# Patient Record
Sex: FEMALE | Race: ASIAN | NOT HISPANIC OR LATINO
[De-identification: names, ages, dates, MRNs, and addresses within clinical notes are randomized per-mention and may not be internally consistent; named-entity substitution may affect disease eponyms.]

---

## 2018-05-15 ENCOUNTER — RESULT REVIEW (OUTPATIENT)
Age: 33
End: 2018-05-15

## 2018-05-17 PROBLEM — Z00.00 ENCOUNTER FOR PREVENTIVE HEALTH EXAMINATION: Status: ACTIVE | Noted: 2018-05-17

## 2018-05-22 ENCOUNTER — APPOINTMENT (OUTPATIENT)
Dept: ANTEPARTUM | Facility: CLINIC | Age: 33
End: 2018-05-22
Payer: COMMERCIAL

## 2018-05-22 ENCOUNTER — ASOB RESULT (OUTPATIENT)
Age: 33
End: 2018-05-22

## 2018-05-22 PROCEDURE — 76801 OB US < 14 WKS SINGLE FETUS: CPT

## 2018-06-14 ENCOUNTER — ASOB RESULT (OUTPATIENT)
Age: 33
End: 2018-06-14

## 2018-06-14 ENCOUNTER — APPOINTMENT (OUTPATIENT)
Dept: ANTEPARTUM | Facility: CLINIC | Age: 33
End: 2018-06-14
Payer: COMMERCIAL

## 2018-06-14 PROCEDURE — 36416 COLLJ CAPILLARY BLOOD SPEC: CPT

## 2018-06-14 PROCEDURE — 76813 OB US NUCHAL MEAS 1 GEST: CPT

## 2018-07-12 ENCOUNTER — ASOB RESULT (OUTPATIENT)
Age: 33
End: 2018-07-12

## 2018-07-12 ENCOUNTER — APPOINTMENT (OUTPATIENT)
Dept: ANTEPARTUM | Facility: CLINIC | Age: 33
End: 2018-07-12
Payer: COMMERCIAL

## 2018-07-12 PROCEDURE — 76805 OB US >/= 14 WKS SNGL FETUS: CPT

## 2018-08-06 ENCOUNTER — ASOB RESULT (OUTPATIENT)
Age: 33
End: 2018-08-06

## 2018-08-06 ENCOUNTER — APPOINTMENT (OUTPATIENT)
Dept: ANTEPARTUM | Facility: CLINIC | Age: 33
End: 2018-08-06
Payer: COMMERCIAL

## 2018-08-06 PROCEDURE — 76811 OB US DETAILED SNGL FETUS: CPT

## 2018-08-06 PROCEDURE — 76817 TRANSVAGINAL US OBSTETRIC: CPT

## 2018-09-06 ENCOUNTER — ASOB RESULT (OUTPATIENT)
Age: 33
End: 2018-09-06

## 2018-09-06 ENCOUNTER — APPOINTMENT (OUTPATIENT)
Dept: ANTEPARTUM | Facility: CLINIC | Age: 33
End: 2018-09-06

## 2018-09-06 ENCOUNTER — APPOINTMENT (OUTPATIENT)
Dept: ANTEPARTUM | Facility: CLINIC | Age: 33
End: 2018-09-06
Payer: COMMERCIAL

## 2018-09-06 PROCEDURE — 76816 OB US FOLLOW-UP PER FETUS: CPT

## 2018-10-04 ENCOUNTER — APPOINTMENT (OUTPATIENT)
Dept: ANTEPARTUM | Facility: CLINIC | Age: 33
End: 2018-10-04

## 2018-11-05 ENCOUNTER — ASOB RESULT (OUTPATIENT)
Age: 33
End: 2018-11-05

## 2018-11-05 ENCOUNTER — APPOINTMENT (OUTPATIENT)
Dept: ANTEPARTUM | Facility: CLINIC | Age: 33
End: 2018-11-05
Payer: COMMERCIAL

## 2018-11-05 PROCEDURE — 76816 OB US FOLLOW-UP PER FETUS: CPT

## 2018-12-05 ENCOUNTER — OUTPATIENT (OUTPATIENT)
Dept: OUTPATIENT SERVICES | Facility: HOSPITAL | Age: 33
LOS: 1 days | End: 2018-12-05
Payer: COMMERCIAL

## 2018-12-05 DIAGNOSIS — Z3A.00 WEEKS OF GESTATION OF PREGNANCY NOT SPECIFIED: ICD-10-CM

## 2018-12-05 DIAGNOSIS — O26.899 OTHER SPECIFIED PREGNANCY RELATED CONDITIONS, UNSPECIFIED TRIMESTER: ICD-10-CM

## 2018-12-05 LAB
ALBUMIN SERPL ELPH-MCNC: 3.3 G/DL — SIGNIFICANT CHANGE UP (ref 3.3–5)
ALP SERPL-CCNC: 160 U/L — HIGH (ref 40–120)
ALT FLD-CCNC: 17 U/L — SIGNIFICANT CHANGE UP (ref 10–45)
ANION GAP SERPL CALC-SCNC: 17 MMOL/L — SIGNIFICANT CHANGE UP (ref 5–17)
APPEARANCE UR: CLEAR — SIGNIFICANT CHANGE UP
AST SERPL-CCNC: 23 U/L — SIGNIFICANT CHANGE UP (ref 10–40)
BASOPHILS # BLD AUTO: 0 K/UL — SIGNIFICANT CHANGE UP (ref 0–0.2)
BASOPHILS NFR BLD AUTO: 0.2 % — SIGNIFICANT CHANGE UP (ref 0–2)
BILIRUB SERPL-MCNC: 0.2 MG/DL — SIGNIFICANT CHANGE UP (ref 0.2–1.2)
BILIRUB UR-MCNC: NEGATIVE — SIGNIFICANT CHANGE UP
BUN SERPL-MCNC: 8 MG/DL — SIGNIFICANT CHANGE UP (ref 7–23)
CALCIUM SERPL-MCNC: 9.5 MG/DL — SIGNIFICANT CHANGE UP (ref 8.4–10.5)
CHLORIDE SERPL-SCNC: 104 MMOL/L — SIGNIFICANT CHANGE UP (ref 96–108)
CO2 SERPL-SCNC: 18 MMOL/L — LOW (ref 22–31)
COLOR SPEC: SIGNIFICANT CHANGE UP
CREAT SERPL-MCNC: 0.46 MG/DL — LOW (ref 0.5–1.3)
DIFF PNL FLD: NEGATIVE — SIGNIFICANT CHANGE UP
EOSINOPHIL # BLD AUTO: 0.1 K/UL — SIGNIFICANT CHANGE UP (ref 0–0.5)
EOSINOPHIL NFR BLD AUTO: 1.2 % — SIGNIFICANT CHANGE UP (ref 0–6)
GLUCOSE SERPL-MCNC: 105 MG/DL — HIGH (ref 70–99)
GLUCOSE UR QL: NEGATIVE — SIGNIFICANT CHANGE UP
HCT VFR BLD CALC: 38.9 % — SIGNIFICANT CHANGE UP (ref 34.5–45)
HGB BLD-MCNC: 13.6 G/DL — SIGNIFICANT CHANGE UP (ref 11.5–15.5)
KETONES UR-MCNC: NEGATIVE — SIGNIFICANT CHANGE UP
LEUKOCYTE ESTERASE UR-ACNC: ABNORMAL
LYMPHOCYTES # BLD AUTO: 1.9 K/UL — SIGNIFICANT CHANGE UP (ref 1–3.3)
LYMPHOCYTES # BLD AUTO: 18.1 % — SIGNIFICANT CHANGE UP (ref 13–44)
MCHC RBC-ENTMCNC: 29.6 PG — SIGNIFICANT CHANGE UP (ref 27–34)
MCHC RBC-ENTMCNC: 34.9 GM/DL — SIGNIFICANT CHANGE UP (ref 32–36)
MCV RBC AUTO: 84.9 FL — SIGNIFICANT CHANGE UP (ref 80–100)
MONOCYTES # BLD AUTO: 0.5 K/UL — SIGNIFICANT CHANGE UP (ref 0–0.9)
MONOCYTES NFR BLD AUTO: 4.8 % — SIGNIFICANT CHANGE UP (ref 2–14)
NEUTROPHILS # BLD AUTO: 7.8 K/UL — HIGH (ref 1.8–7.4)
NEUTROPHILS NFR BLD AUTO: 75.7 % — SIGNIFICANT CHANGE UP (ref 43–77)
NITRITE UR-MCNC: NEGATIVE — SIGNIFICANT CHANGE UP
PH UR: 6.5 — SIGNIFICANT CHANGE UP (ref 5–8)
PLATELET # BLD AUTO: 254 K/UL — SIGNIFICANT CHANGE UP (ref 150–400)
POTASSIUM SERPL-MCNC: 3.9 MMOL/L — SIGNIFICANT CHANGE UP (ref 3.5–5.3)
POTASSIUM SERPL-SCNC: 3.9 MMOL/L — SIGNIFICANT CHANGE UP (ref 3.5–5.3)
PROT SERPL-MCNC: 6.7 G/DL — SIGNIFICANT CHANGE UP (ref 6–8.3)
PROT UR-MCNC: NEGATIVE — SIGNIFICANT CHANGE UP
RBC # BLD: 4.58 M/UL — SIGNIFICANT CHANGE UP (ref 3.8–5.2)
RBC # FLD: 13.6 % — SIGNIFICANT CHANGE UP (ref 10.3–14.5)
RH IG SCN BLD-IMP: POSITIVE — SIGNIFICANT CHANGE UP
SODIUM SERPL-SCNC: 139 MMOL/L — SIGNIFICANT CHANGE UP (ref 135–145)
SP GR SPEC: 1.01 — SIGNIFICANT CHANGE UP (ref 1.01–1.02)
UROBILINOGEN FLD QL: NEGATIVE — SIGNIFICANT CHANGE UP
WBC # BLD: 10.3 K/UL — SIGNIFICANT CHANGE UP (ref 3.8–10.5)
WBC # FLD AUTO: 10.3 K/UL — SIGNIFICANT CHANGE UP (ref 3.8–10.5)

## 2018-12-05 PROCEDURE — 86901 BLOOD TYPING SEROLOGIC RH(D): CPT

## 2018-12-05 PROCEDURE — 80053 COMPREHEN METABOLIC PANEL: CPT

## 2018-12-05 PROCEDURE — 86850 RBC ANTIBODY SCREEN: CPT

## 2018-12-05 PROCEDURE — 81001 URINALYSIS AUTO W/SCOPE: CPT

## 2018-12-05 PROCEDURE — 85027 COMPLETE CBC AUTOMATED: CPT

## 2018-12-05 PROCEDURE — 86900 BLOOD TYPING SEROLOGIC ABO: CPT

## 2018-12-05 PROCEDURE — 59025 FETAL NON-STRESS TEST: CPT | Mod: 26

## 2018-12-05 PROCEDURE — 59025 FETAL NON-STRESS TEST: CPT

## 2018-12-05 PROCEDURE — G0463: CPT

## 2018-12-05 RX ORDER — SODIUM CHLORIDE 9 MG/ML
1000 INJECTION, SOLUTION INTRAVENOUS
Qty: 0 | Refills: 0 | Status: DISCONTINUED | OUTPATIENT
Start: 2018-12-05 | End: 2018-12-20

## 2018-12-05 RX ORDER — SODIUM CHLORIDE 9 MG/ML
500 INJECTION, SOLUTION INTRAVENOUS ONCE
Qty: 0 | Refills: 0 | Status: COMPLETED | OUTPATIENT
Start: 2018-12-05 | End: 2018-12-05

## 2018-12-05 RX ORDER — ACETAMINOPHEN 500 MG
975 TABLET ORAL ONCE
Qty: 0 | Refills: 0 | Status: COMPLETED | OUTPATIENT
Start: 2018-12-05 | End: 2018-12-05

## 2018-12-10 ENCOUNTER — ASOB RESULT (OUTPATIENT)
Age: 33
End: 2018-12-10

## 2018-12-10 ENCOUNTER — APPOINTMENT (OUTPATIENT)
Dept: ANTEPARTUM | Facility: CLINIC | Age: 33
End: 2018-12-10
Payer: COMMERCIAL

## 2018-12-10 PROCEDURE — 76816 OB US FOLLOW-UP PER FETUS: CPT

## 2018-12-20 ENCOUNTER — APPOINTMENT (OUTPATIENT)
Dept: ANTEPARTUM | Facility: CLINIC | Age: 33
End: 2018-12-20

## 2018-12-20 ENCOUNTER — INPATIENT (INPATIENT)
Facility: HOSPITAL | Age: 33
LOS: 1 days | Discharge: HOME | DRG: 776 | End: 2018-12-22
Attending: OBSTETRICS & GYNECOLOGY | Admitting: OBSTETRICS & GYNECOLOGY
Payer: COMMERCIAL

## 2018-12-20 VITALS — WEIGHT: 180.78 LBS | HEIGHT: 62 IN

## 2018-12-20 DIAGNOSIS — O26.899 OTHER SPECIFIED PREGNANCY RELATED CONDITIONS, UNSPECIFIED TRIMESTER: ICD-10-CM

## 2018-12-20 DIAGNOSIS — Z34.80 ENCOUNTER FOR SUPERVISION OF OTHER NORMAL PREGNANCY, UNSPECIFIED TRIMESTER: ICD-10-CM

## 2018-12-20 DIAGNOSIS — Z3A.00 WEEKS OF GESTATION OF PREGNANCY NOT SPECIFIED: ICD-10-CM

## 2018-12-20 LAB
ALBUMIN SERPL ELPH-MCNC: 2.8 G/DL — LOW (ref 3.3–5)
ALP SERPL-CCNC: 138 U/L — HIGH (ref 40–120)
ALT FLD-CCNC: 19 U/L — SIGNIFICANT CHANGE UP (ref 10–45)
ANION GAP SERPL CALC-SCNC: 13 MMOL/L — SIGNIFICANT CHANGE UP (ref 5–17)
APPEARANCE UR: ABNORMAL
APTT BLD: 27.1 SEC — LOW (ref 27.5–36.3)
AST SERPL-CCNC: 30 U/L — SIGNIFICANT CHANGE UP (ref 10–40)
BASOPHILS # BLD AUTO: 0 K/UL — SIGNIFICANT CHANGE UP (ref 0–0.2)
BASOPHILS # BLD AUTO: 0.1 K/UL — SIGNIFICANT CHANGE UP (ref 0–0.2)
BASOPHILS NFR BLD AUTO: 0.3 % — SIGNIFICANT CHANGE UP (ref 0–2)
BASOPHILS NFR BLD AUTO: 0.4 % — SIGNIFICANT CHANGE UP (ref 0–2)
BILIRUB SERPL-MCNC: 0.4 MG/DL — SIGNIFICANT CHANGE UP (ref 0.2–1.2)
BILIRUB UR-MCNC: NEGATIVE — SIGNIFICANT CHANGE UP
BLD GP AB SCN SERPL QL: NEGATIVE — SIGNIFICANT CHANGE UP
BUN SERPL-MCNC: 7 MG/DL — SIGNIFICANT CHANGE UP (ref 7–23)
CALCIUM SERPL-MCNC: 8.9 MG/DL — SIGNIFICANT CHANGE UP (ref 8.4–10.5)
CHLORIDE SERPL-SCNC: 102 MMOL/L — SIGNIFICANT CHANGE UP (ref 96–108)
CO2 SERPL-SCNC: 23 MMOL/L — SIGNIFICANT CHANGE UP (ref 22–31)
COLOR SPEC: COLORLESS — SIGNIFICANT CHANGE UP
CREAT SERPL-MCNC: 0.54 MG/DL — SIGNIFICANT CHANGE UP (ref 0.5–1.3)
DIFF PNL FLD: ABNORMAL
EOSINOPHIL # BLD AUTO: 0 K/UL — SIGNIFICANT CHANGE UP (ref 0–0.5)
EOSINOPHIL # BLD AUTO: 0.1 K/UL — SIGNIFICANT CHANGE UP (ref 0–0.5)
EOSINOPHIL NFR BLD AUTO: 0.2 % — SIGNIFICANT CHANGE UP (ref 0–6)
EOSINOPHIL NFR BLD AUTO: 0.6 % — SIGNIFICANT CHANGE UP (ref 0–6)
FIBRINOGEN PPP-MCNC: 594 MG/DL — HIGH (ref 350–510)
GLUCOSE SERPL-MCNC: 110 MG/DL — HIGH (ref 70–99)
GLUCOSE UR QL: NEGATIVE — SIGNIFICANT CHANGE UP
HCT VFR BLD CALC: 35.2 % — SIGNIFICANT CHANGE UP (ref 34.5–45)
HCT VFR BLD CALC: 42.3 % — SIGNIFICANT CHANGE UP (ref 34.5–45)
HGB BLD-MCNC: 12.2 G/DL — SIGNIFICANT CHANGE UP (ref 11.5–15.5)
HGB BLD-MCNC: 13.4 G/DL — SIGNIFICANT CHANGE UP (ref 11.5–15.5)
INR BLD: 0.96 RATIO — SIGNIFICANT CHANGE UP (ref 0.88–1.16)
KETONES UR-MCNC: ABNORMAL
LDH SERPL L TO P-CCNC: 232 U/L — SIGNIFICANT CHANGE UP (ref 50–242)
LEUKOCYTE ESTERASE UR-ACNC: NEGATIVE — SIGNIFICANT CHANGE UP
LYMPHOCYTES # BLD AUTO: 1.2 K/UL — SIGNIFICANT CHANGE UP (ref 1–3.3)
LYMPHOCYTES # BLD AUTO: 1.6 K/UL — SIGNIFICANT CHANGE UP (ref 1–3.3)
LYMPHOCYTES # BLD AUTO: 14.2 % — SIGNIFICANT CHANGE UP (ref 13–44)
LYMPHOCYTES # BLD AUTO: 7.4 % — LOW (ref 13–44)
MCHC RBC-ENTMCNC: 26.6 PG — LOW (ref 27–34)
MCHC RBC-ENTMCNC: 29.4 PG — SIGNIFICANT CHANGE UP (ref 27–34)
MCHC RBC-ENTMCNC: 31.6 GM/DL — LOW (ref 32–36)
MCHC RBC-ENTMCNC: 34.8 GM/DL — SIGNIFICANT CHANGE UP (ref 32–36)
MCV RBC AUTO: 84.2 FL — SIGNIFICANT CHANGE UP (ref 80–100)
MCV RBC AUTO: 84.5 FL — SIGNIFICANT CHANGE UP (ref 80–100)
MONOCYTES # BLD AUTO: 0.5 K/UL — SIGNIFICANT CHANGE UP (ref 0–0.9)
MONOCYTES # BLD AUTO: 0.7 K/UL — SIGNIFICANT CHANGE UP (ref 0–0.9)
MONOCYTES NFR BLD AUTO: 4.1 % — SIGNIFICANT CHANGE UP (ref 2–14)
MONOCYTES NFR BLD AUTO: 4.2 % — SIGNIFICANT CHANGE UP (ref 2–14)
NEUTROPHILS # BLD AUTO: 14.3 K/UL — HIGH (ref 1.8–7.4)
NEUTROPHILS # BLD AUTO: 8.8 K/UL — HIGH (ref 1.8–7.4)
NEUTROPHILS NFR BLD AUTO: 80.6 % — HIGH (ref 43–77)
NEUTROPHILS NFR BLD AUTO: 88 % — HIGH (ref 43–77)
NITRITE UR-MCNC: NEGATIVE — SIGNIFICANT CHANGE UP
PH UR: 7 — SIGNIFICANT CHANGE UP (ref 5–8)
PLATELET # BLD AUTO: 201 K/UL — SIGNIFICANT CHANGE UP (ref 150–400)
PLATELET # BLD AUTO: 256 K/UL — SIGNIFICANT CHANGE UP (ref 150–400)
POTASSIUM SERPL-MCNC: 3.3 MMOL/L — LOW (ref 3.5–5.3)
POTASSIUM SERPL-SCNC: 3.3 MMOL/L — LOW (ref 3.5–5.3)
PROT SERPL-MCNC: 5.6 G/DL — LOW (ref 6–8.3)
PROT UR-MCNC: NEGATIVE — SIGNIFICANT CHANGE UP
PROTHROM AB SERPL-ACNC: 11 SEC — SIGNIFICANT CHANGE UP (ref 10–12.9)
RBC # BLD: 4.16 M/UL — SIGNIFICANT CHANGE UP (ref 3.8–5.2)
RBC # BLD: 5.02 M/UL — SIGNIFICANT CHANGE UP (ref 3.8–5.2)
RBC # FLD: 13 % — SIGNIFICANT CHANGE UP (ref 10.3–14.5)
RBC # FLD: 13.4 % — SIGNIFICANT CHANGE UP (ref 10.3–14.5)
RH IG SCN BLD-IMP: POSITIVE — SIGNIFICANT CHANGE UP
SODIUM SERPL-SCNC: 138 MMOL/L — SIGNIFICANT CHANGE UP (ref 135–145)
SP GR SPEC: 1.01 — SIGNIFICANT CHANGE UP (ref 1.01–1.02)
T PALLIDUM AB TITR SER: NEGATIVE — SIGNIFICANT CHANGE UP
URATE SERPL-MCNC: 4.3 MG/DL — SIGNIFICANT CHANGE UP (ref 2.5–7)
UROBILINOGEN FLD QL: NEGATIVE — SIGNIFICANT CHANGE UP
WBC # BLD: 11 K/UL — HIGH (ref 3.8–10.5)
WBC # BLD: 16.3 K/UL — HIGH (ref 3.8–10.5)
WBC # FLD AUTO: 11 K/UL — HIGH (ref 3.8–10.5)
WBC # FLD AUTO: 16.3 K/UL — HIGH (ref 3.8–10.5)

## 2018-12-20 RX ORDER — KETOROLAC TROMETHAMINE 30 MG/ML
30 SYRINGE (ML) INJECTION ONCE
Qty: 0 | Refills: 0 | Status: DISCONTINUED | OUTPATIENT
Start: 2018-12-20 | End: 2018-12-20

## 2018-12-20 RX ORDER — OXYTOCIN 10 UNIT/ML
333.33 VIAL (ML) INJECTION
Qty: 20 | Refills: 0 | Status: COMPLETED | OUTPATIENT
Start: 2018-12-20

## 2018-12-20 RX ORDER — LANOLIN
1 OINTMENT (GRAM) TOPICAL EVERY 6 HOURS
Qty: 0 | Refills: 0 | Status: DISCONTINUED | OUTPATIENT
Start: 2018-12-20 | End: 2018-12-22

## 2018-12-20 RX ORDER — IBUPROFEN 200 MG
600 TABLET ORAL EVERY 6 HOURS
Qty: 0 | Refills: 0 | Status: DISCONTINUED | OUTPATIENT
Start: 2018-12-20 | End: 2018-12-22

## 2018-12-20 RX ORDER — OXYTOCIN 10 UNIT/ML
333.33 VIAL (ML) INJECTION
Qty: 20 | Refills: 0 | Status: DISCONTINUED | OUTPATIENT
Start: 2018-12-20 | End: 2018-12-22

## 2018-12-20 RX ORDER — OXYTOCIN 10 UNIT/ML
41.67 VIAL (ML) INJECTION
Qty: 20 | Refills: 0 | Status: DISCONTINUED | OUTPATIENT
Start: 2018-12-20 | End: 2018-12-22

## 2018-12-20 RX ORDER — OXYCODONE HYDROCHLORIDE 5 MG/1
5 TABLET ORAL EVERY 4 HOURS
Qty: 0 | Refills: 0 | Status: DISCONTINUED | OUTPATIENT
Start: 2018-12-20 | End: 2018-12-22

## 2018-12-20 RX ORDER — DIPHENHYDRAMINE HCL 50 MG
25 CAPSULE ORAL EVERY 6 HOURS
Qty: 0 | Refills: 0 | Status: DISCONTINUED | OUTPATIENT
Start: 2018-12-20 | End: 2018-12-22

## 2018-12-20 RX ORDER — AER TRAVELER 0.5 G/1
1 SOLUTION RECTAL; TOPICAL EVERY 4 HOURS
Qty: 0 | Refills: 0 | Status: DISCONTINUED | OUTPATIENT
Start: 2018-12-20 | End: 2018-12-22

## 2018-12-20 RX ORDER — IBUPROFEN 200 MG
600 TABLET ORAL EVERY 6 HOURS
Qty: 0 | Refills: 0 | Status: COMPLETED | OUTPATIENT
Start: 2018-12-20 | End: 2019-11-18

## 2018-12-20 RX ORDER — TETANUS TOXOID, REDUCED DIPHTHERIA TOXOID AND ACELLULAR PERTUSSIS VACCINE, ADSORBED 5; 2.5; 8; 8; 2.5 [IU]/.5ML; [IU]/.5ML; UG/.5ML; UG/.5ML; UG/.5ML
0.5 SUSPENSION INTRAMUSCULAR ONCE
Qty: 0 | Refills: 0 | Status: DISCONTINUED | OUTPATIENT
Start: 2018-12-20 | End: 2018-12-22

## 2018-12-20 RX ORDER — SODIUM CHLORIDE 9 MG/ML
1000 INJECTION, SOLUTION INTRAVENOUS
Qty: 0 | Refills: 0 | Status: DISCONTINUED | OUTPATIENT
Start: 2018-12-20 | End: 2018-12-20

## 2018-12-20 RX ORDER — SIMETHICONE 80 MG/1
80 TABLET, CHEWABLE ORAL EVERY 6 HOURS
Qty: 0 | Refills: 0 | Status: DISCONTINUED | OUTPATIENT
Start: 2018-12-20 | End: 2018-12-22

## 2018-12-20 RX ORDER — HYDROCORTISONE 1 %
1 OINTMENT (GRAM) TOPICAL EVERY 4 HOURS
Qty: 0 | Refills: 0 | Status: DISCONTINUED | OUTPATIENT
Start: 2018-12-20 | End: 2018-12-22

## 2018-12-20 RX ORDER — SODIUM CHLORIDE 9 MG/ML
3 INJECTION INTRAMUSCULAR; INTRAVENOUS; SUBCUTANEOUS EVERY 8 HOURS
Qty: 0 | Refills: 0 | Status: DISCONTINUED | OUTPATIENT
Start: 2018-12-20 | End: 2018-12-22

## 2018-12-20 RX ORDER — MAGNESIUM HYDROXIDE 400 MG/1
30 TABLET, CHEWABLE ORAL
Qty: 0 | Refills: 0 | Status: DISCONTINUED | OUTPATIENT
Start: 2018-12-20 | End: 2018-12-22

## 2018-12-20 RX ORDER — GLYCERIN ADULT
1 SUPPOSITORY, RECTAL RECTAL AT BEDTIME
Qty: 0 | Refills: 0 | Status: DISCONTINUED | OUTPATIENT
Start: 2018-12-20 | End: 2018-12-22

## 2018-12-20 RX ORDER — PRAMOXINE HYDROCHLORIDE 150 MG/15G
1 AEROSOL, FOAM RECTAL EVERY 4 HOURS
Qty: 0 | Refills: 0 | Status: DISCONTINUED | OUTPATIENT
Start: 2018-12-20 | End: 2018-12-22

## 2018-12-20 RX ORDER — DIBUCAINE 1 %
1 OINTMENT (GRAM) RECTAL EVERY 4 HOURS
Qty: 0 | Refills: 0 | Status: DISCONTINUED | OUTPATIENT
Start: 2018-12-20 | End: 2018-12-22

## 2018-12-20 RX ORDER — ACETAMINOPHEN 500 MG
975 TABLET ORAL EVERY 6 HOURS
Qty: 0 | Refills: 0 | Status: DISCONTINUED | OUTPATIENT
Start: 2018-12-20 | End: 2018-12-22

## 2018-12-20 RX ORDER — SODIUM CHLORIDE 9 MG/ML
500 INJECTION, SOLUTION INTRAVENOUS ONCE
Qty: 0 | Refills: 0 | Status: COMPLETED | OUTPATIENT
Start: 2018-12-20 | End: 2018-12-20

## 2018-12-20 RX ORDER — ACETAMINOPHEN 500 MG
975 TABLET ORAL EVERY 6 HOURS
Qty: 0 | Refills: 0 | Status: COMPLETED | OUTPATIENT
Start: 2018-12-20 | End: 2019-11-18

## 2018-12-20 RX ORDER — DOCUSATE SODIUM 100 MG
100 CAPSULE ORAL
Qty: 0 | Refills: 0 | Status: DISCONTINUED | OUTPATIENT
Start: 2018-12-20 | End: 2018-12-22

## 2018-12-20 RX ORDER — OXYCODONE HYDROCHLORIDE 5 MG/1
5 TABLET ORAL
Qty: 0 | Refills: 0 | Status: DISCONTINUED | OUTPATIENT
Start: 2018-12-20 | End: 2018-12-22

## 2018-12-20 RX ORDER — OXYTOCIN 10 UNIT/ML
333.33 VIAL (ML) INJECTION
Qty: 20 | Refills: 0 | Status: DISCONTINUED | OUTPATIENT
Start: 2018-12-20 | End: 2018-12-20

## 2018-12-20 RX ORDER — CITRIC ACID/SODIUM CITRATE 300-500 MG
15 SOLUTION, ORAL ORAL EVERY 4 HOURS
Qty: 0 | Refills: 0 | Status: DISCONTINUED | OUTPATIENT
Start: 2018-12-20 | End: 2018-12-20

## 2018-12-20 RX ADMIN — SODIUM CHLORIDE 125 MILLILITER(S): 9 INJECTION, SOLUTION INTRAVENOUS at 11:39

## 2018-12-20 RX ADMIN — SODIUM CHLORIDE 125 MILLILITER(S): 9 INJECTION, SOLUTION INTRAVENOUS at 10:31

## 2018-12-20 RX ADMIN — Medication 1000 MILLIUNIT(S)/MIN: at 14:32

## 2018-12-20 RX ADMIN — SODIUM CHLORIDE 125 MILLILITER(S): 9 INJECTION, SOLUTION INTRAVENOUS at 09:15

## 2018-12-20 RX ADMIN — Medication 30 MILLIGRAM(S): at 16:12

## 2018-12-20 RX ADMIN — SODIUM CHLORIDE 1000 MILLILITER(S): 9 INJECTION, SOLUTION INTRAVENOUS at 08:45

## 2018-12-20 NOTE — CHART NOTE - NSCHARTNOTEFT_GEN_A_CORE
PA Note  called to evaluate patient by RN for extra bleeding.  pt resting in bed with some pain- WNL  RN reports pt was unable to urinate straight cath yielded 1000cc- per RN  vsICU Vital Signs Last 24 Hrs  T(C): 36.6 (20 Dec 2018 13:45), Max: 36.6 (20 Dec 2018 13:45)  T(F): 97.9 (20 Dec 2018 13:45), Max: 97.9 (20 Dec 2018 13:45)  HR: 102 (20 Dec 2018 15:45) (78 - 108)  BP: 147/88 (20 Dec 2018 15:45) (117/66 - 147/88)  BP(mean): 112 (20 Dec 2018 15:45) (93 - 112)  ABP: --  ABP(mean): --  RR: 25 (20 Dec 2018 15:45) (18 - 30)  SpO2: 95% (20 Dec 2018 15:45) (94% - 96%)      ve- about 50cc total clot excavated. no active bleeding noted at this time  34 yo s/p  with elevated BP and unable to void   HELLP labs  cameron catheter to prevent urinary retention  monitor sx sx of bleeding  cont routine postpartum care  DW Dr Julio Dacosta PA-C

## 2018-12-20 NOTE — PROVIDER CONTACT NOTE (OTHER) - BACKGROUND
pt awaiting d/c from recovery room and was unable to void on her on due to swelling from . 1000mL clear yellow urine emptied and then pt was havbing some moderate bleeding.

## 2018-12-20 NOTE — PROVIDER CONTACT NOTE (OTHER) - ACTION/TREATMENT ORDERED:
bill came and removed about 50 mL of clots from uterus. bleeding controlled after that. will monitor.

## 2018-12-21 ENCOUNTER — TRANSCRIPTION ENCOUNTER (OUTPATIENT)
Age: 33
End: 2018-12-21

## 2018-12-21 LAB
HCT VFR BLD CALC: 31.1 % — LOW (ref 34.5–45)
HGB BLD-MCNC: 10.3 G/DL — LOW (ref 11.5–15.5)

## 2018-12-21 RX ORDER — DOCUSATE SODIUM 100 MG
1 CAPSULE ORAL
Qty: 0 | Refills: 0 | DISCHARGE
Start: 2018-12-21

## 2018-12-21 RX ORDER — IBUPROFEN 200 MG
1 TABLET ORAL
Qty: 0 | Refills: 0 | DISCHARGE
Start: 2018-12-21

## 2018-12-21 RX ORDER — ACETAMINOPHEN 500 MG
3 TABLET ORAL
Qty: 0 | Refills: 0 | DISCHARGE
Start: 2018-12-21

## 2018-12-21 RX ORDER — SIMETHICONE 80 MG/1
1 TABLET, CHEWABLE ORAL
Qty: 0 | Refills: 0 | DISCHARGE
Start: 2018-12-21

## 2018-12-21 RX ADMIN — Medication 100 MILLIGRAM(S): at 09:05

## 2018-12-21 RX ADMIN — Medication 600 MILLIGRAM(S): at 12:38

## 2018-12-21 RX ADMIN — Medication 975 MILLIGRAM(S): at 14:15

## 2018-12-21 RX ADMIN — Medication 600 MILLIGRAM(S): at 05:41

## 2018-12-21 RX ADMIN — Medication 1 TABLET(S): at 12:38

## 2018-12-21 RX ADMIN — OXYCODONE HYDROCHLORIDE 5 MILLIGRAM(S): 5 TABLET ORAL at 09:05

## 2018-12-21 RX ADMIN — Medication 975 MILLIGRAM(S): at 15:06

## 2018-12-21 RX ADMIN — Medication 600 MILLIGRAM(S): at 13:30

## 2018-12-21 RX ADMIN — Medication 975 MILLIGRAM(S): at 05:42

## 2018-12-21 RX ADMIN — Medication 600 MILLIGRAM(S): at 06:27

## 2018-12-21 RX ADMIN — OXYCODONE HYDROCHLORIDE 5 MILLIGRAM(S): 5 TABLET ORAL at 13:00

## 2018-12-21 RX ADMIN — OXYCODONE HYDROCHLORIDE 5 MILLIGRAM(S): 5 TABLET ORAL at 13:30

## 2018-12-21 RX ADMIN — Medication 600 MILLIGRAM(S): at 18:16

## 2018-12-21 RX ADMIN — OXYCODONE HYDROCHLORIDE 5 MILLIGRAM(S): 5 TABLET ORAL at 10:00

## 2018-12-21 RX ADMIN — Medication 975 MILLIGRAM(S): at 06:27

## 2018-12-21 RX ADMIN — Medication 975 MILLIGRAM(S): at 20:55

## 2018-12-21 RX ADMIN — Medication 975 MILLIGRAM(S): at 20:17

## 2018-12-21 NOTE — DISCHARGE NOTE OB - CARE PROVIDER_API CALL
Dylon Wilburn), Obstetrics and Gynecology  [address] [facility]  [address]  [place] [address]  Phone: [phone]  Fax: [phone]

## 2018-12-21 NOTE — DISCHARGE NOTE OB - MATERIALS PROVIDED
Immunization Record/Breastfeeding Log/Back To Sleep Handout/Breastfeeding Mother’s Support Group Information/Guide to Postpartum Care/Birth Certificate Instructions/[facility] Hearing Screen Program/Shaken Baby Prevention Handout/Breastfeeding Guide and Packet/Vaccinations/[facility] [place] Screening Program

## 2018-12-21 NOTE — DISCHARGE NOTE OB - CARE PLAN
Principal Discharge DX:	Normal vaginal delivery  Goal:	recovery  Assessment and plan of treatment:	see below

## 2018-12-21 NOTE — DISCHARGE NOTE OB - PATIENT PORTAL LINK FT
You can access the [facility] Patient Portal, offered by [facility], by registering with the following website: [email or web address]

## 2018-12-21 NOTE — PROGRESS NOTE ADULT - ATTENDING COMMENTS
Pt seen and examined.  agree with note above.  pt doing well, voiding without issues  routine PP care.

## 2018-12-21 NOTE — PROGRESS NOTE ADULT - SUBJECTIVE AND OBJECTIVE BOX
Postpartum Note- PPD#1    Prenatal Labs  Blood type: O Positive  Rubella IgG: Imm  RPR: Negative        S:Patient w/o complaints, pain is controlled.    Pt is OOB, tolerating PO, voiding. Lochia WNL.     O:  Vital Signs Last 24 Hrs  T(C): 36.7 (21 Dec 2018 05:07), Max: 37.2 (20 Dec 2018 17:30)  T(F): 98 (21 Dec 2018 05:07), Max: 99 (20 Dec 2018 17:30)  HR: 89 (21 Dec 2018 05:07) (78 - 108)  BP: 125/86 (21 Dec 2018 05:07) (117/66 - 147/88)  BP(mean): 104 (20 Dec 2018 17:15) (93 - 112)  RR: 18 (21 Dec 2018 05:07) (16 - 32)  SpO2: 97% (21 Dec 2018 05:07) (94% - 98%)     Gen: NAD  Abdomen: Soft, nontender, non-distended, fundus firm.  Vaginal: Lochia WNL,    Ext:  Neg calf tenderness    LABS:    Hemoglobin: 10.3 g/dL (12-21 @ 08:35)  Hemoglobin: 12.2 g/dL (12-20 @ 17:15)  Hemoglobin: 13.4 g/dL (12-20 @ 09:34)      Hematocrit: 31.1 % (12-21 @ 08:35)  Hematocrit: 35.2 % (12-20 @ 17:15)  Hematocrit: 42.3 % (12-20 @ 09:34)

## 2018-12-21 NOTE — PROGRESS NOTE ADULT - ASSESSMENT
A/P:  33y  PPD # 1   S/P     with 2nd degree  laceration     Doing Well    PMHx: h/o fibroids  Current Issues: Urinary retention now resolved

## 2018-12-21 NOTE — DISCHARGE NOTE OB - MEDICATION SUMMARY - MEDICATIONS TO TAKE
I will START or STAY ON the medications listed below when I get home from the hospital:    acetaminophen 325 mg oral tablet  -- 3 tab(s) by mouth every 6 hours  -- Indication: For pain and fever    ibuprofen 600 mg oral tablet  -- 1 tab(s) by mouth every 6 hours  -- Indication: For for pain and cramping    PreNata  -- Indication: For for nutrition    docusate sodium 100 mg oral capsule  -- 1 cap(s) by mouth 2 times a day, As needed, Stool Softening  -- Indication: For for constipation    simethicone 80 mg oral tablet, chewable  -- 1 tab(s) by mouth every 6 hours, As needed, Gas  -- Indication: For for gas pain

## 2018-12-22 VITALS
TEMPERATURE: 98 F | SYSTOLIC BLOOD PRESSURE: 124 MMHG | HEART RATE: 101 BPM | DIASTOLIC BLOOD PRESSURE: 85 MMHG | RESPIRATION RATE: 18 BRPM

## 2018-12-22 PROCEDURE — 83615 LACTATE (LD) (LDH) ENZYME: CPT

## 2018-12-22 PROCEDURE — G0463: CPT

## 2018-12-22 PROCEDURE — 59025 FETAL NON-STRESS TEST: CPT

## 2018-12-22 PROCEDURE — 85018 HEMOGLOBIN: CPT

## 2018-12-22 PROCEDURE — 85384 FIBRINOGEN ACTIVITY: CPT

## 2018-12-22 PROCEDURE — 86780 TREPONEMA PALLIDUM: CPT

## 2018-12-22 PROCEDURE — 85730 THROMBOPLASTIN TIME PARTIAL: CPT

## 2018-12-22 PROCEDURE — 85610 PROTHROMBIN TIME: CPT

## 2018-12-22 PROCEDURE — 85027 COMPLETE CBC AUTOMATED: CPT

## 2018-12-22 PROCEDURE — 59050 FETAL MONITOR W/REPORT: CPT

## 2018-12-22 PROCEDURE — 84550 ASSAY OF BLOOD/URIC ACID: CPT

## 2018-12-22 PROCEDURE — 85014 HEMATOCRIT: CPT

## 2018-12-22 PROCEDURE — 80053 COMPREHEN METABOLIC PANEL: CPT

## 2018-12-22 PROCEDURE — 81001 URINALYSIS AUTO W/SCOPE: CPT

## 2018-12-22 RX ADMIN — Medication 600 MILLIGRAM(S): at 06:20

## 2018-12-22 RX ADMIN — Medication 600 MILLIGRAM(S): at 06:55

## 2018-12-22 RX ADMIN — Medication 975 MILLIGRAM(S): at 11:43

## 2018-12-22 RX ADMIN — Medication 600 MILLIGRAM(S): at 11:43

## 2018-12-22 RX ADMIN — OXYCODONE HYDROCHLORIDE 5 MILLIGRAM(S): 5 TABLET ORAL at 03:50

## 2018-12-22 RX ADMIN — OXYCODONE HYDROCHLORIDE 5 MILLIGRAM(S): 5 TABLET ORAL at 04:30

## 2018-12-22 RX ADMIN — Medication 975 MILLIGRAM(S): at 03:50

## 2018-12-22 RX ADMIN — Medication 975 MILLIGRAM(S): at 04:30

## 2018-12-22 NOTE — PROGRESS NOTE ADULT - SUBJECTIVE AND OBJECTIVE BOX
Ob Attg daily progress note:    s/p vaginal delivery 2 days ago.  She's now well, and offers no c/o.  no excessive bleeding.  cramps are mild.    Vital Signs Last 24 Hrs  T(C): 36.7 (22 Dec 2018 05:00), Max: 36.7 (21 Dec 2018 18:02)  T(F): 98.1 (22 Dec 2018 05:00), Max: 98.1 (21 Dec 2018 18:02)  HR: 101 (22 Dec 2018 05:00) (82 - 101)  BP: 124/85 (22 Dec 2018 05:00) (119/82 - 124/85)  BP(mean): --  RR: 18 (22 Dec 2018 05:00) (18 - 18)  SpO2: --    Abd is soft, NT, ND.  uterine fundus is firm and NT.  Ext: normal. trace edema.  no tenderness  Perineum:  intact.  lochia is WNL.  Back: normal.  Lungs / Heart: clear and normal.    A/P:    s/p vag delivery.  stable and well today.   Pt is cleared for discharge today.  Follow up and discharge instructions given and all q's answered.

## 2018-12-23 ENCOUNTER — INPATIENT (INPATIENT)
Facility: HOSPITAL | Age: 33
LOS: 0 days | Discharge: HOME | DRG: 776 | End: 2018-12-24
Attending: OBSTETRICS & GYNECOLOGY | Admitting: OBSTETRICS & GYNECOLOGY
Payer: COMMERCIAL

## 2018-12-23 VITALS
HEART RATE: 89 BPM | RESPIRATION RATE: 18 BRPM | WEIGHT: 181 LBS | SYSTOLIC BLOOD PRESSURE: 167 MMHG | HEIGHT: 62 IN | TEMPERATURE: 98 F | OXYGEN SATURATION: 96 % | DIASTOLIC BLOOD PRESSURE: 112 MMHG

## 2018-12-23 DIAGNOSIS — O14.90 UNSPECIFIED PRE-ECLAMPSIA, UNSPECIFIED TRIMESTER: ICD-10-CM

## 2018-12-23 DIAGNOSIS — O14.10 SEVERE PRE-ECLAMPSIA, UNSPECIFIED TRIMESTER: ICD-10-CM

## 2018-12-23 LAB
ALBUMIN SERPL ELPH-MCNC: 3.3 G/DL — SIGNIFICANT CHANGE UP (ref 3.3–5)
ALP SERPL-CCNC: 123 U/L — HIGH (ref 40–120)
ALT FLD-CCNC: 27 U/L — SIGNIFICANT CHANGE UP (ref 10–45)
ANION GAP SERPL CALC-SCNC: 16 MMOL/L — SIGNIFICANT CHANGE UP (ref 5–17)
APPEARANCE UR: CLEAR — SIGNIFICANT CHANGE UP
APTT BLD: 28.9 SEC — SIGNIFICANT CHANGE UP (ref 27.5–36.3)
AST SERPL-CCNC: 34 U/L — SIGNIFICANT CHANGE UP (ref 10–40)
BACTERIA # UR AUTO: NEGATIVE — SIGNIFICANT CHANGE UP
BASE EXCESS BLDV CALC-SCNC: 2.6 MMOL/L — HIGH (ref -2–2)
BASOPHILS # BLD AUTO: 0.1 K/UL — SIGNIFICANT CHANGE UP (ref 0–0.2)
BASOPHILS NFR BLD AUTO: 0.5 % — SIGNIFICANT CHANGE UP (ref 0–2)
BILIRUB SERPL-MCNC: 0.2 MG/DL — SIGNIFICANT CHANGE UP (ref 0.2–1.2)
BILIRUB UR-MCNC: NEGATIVE — SIGNIFICANT CHANGE UP
BUN SERPL-MCNC: 18 MG/DL — SIGNIFICANT CHANGE UP (ref 7–23)
CA-I SERPL-SCNC: 1.27 MMOL/L — SIGNIFICANT CHANGE UP (ref 1.12–1.3)
CALCIUM SERPL-MCNC: 9.8 MG/DL — SIGNIFICANT CHANGE UP (ref 8.4–10.5)
CHLORIDE BLDV-SCNC: 107 MMOL/L — SIGNIFICANT CHANGE UP (ref 96–108)
CHLORIDE SERPL-SCNC: 102 MMOL/L — SIGNIFICANT CHANGE UP (ref 96–108)
CO2 BLDV-SCNC: 28 MMOL/L — SIGNIFICANT CHANGE UP (ref 22–30)
CO2 SERPL-SCNC: 24 MMOL/L — SIGNIFICANT CHANGE UP (ref 22–31)
COLOR SPEC: COLORLESS — SIGNIFICANT CHANGE UP
CREAT SERPL-MCNC: 0.78 MG/DL — SIGNIFICANT CHANGE UP (ref 0.5–1.3)
DIFF PNL FLD: ABNORMAL
EOSINOPHIL # BLD AUTO: 0.3 K/UL — SIGNIFICANT CHANGE UP (ref 0–0.5)
EOSINOPHIL NFR BLD AUTO: 3.2 % — SIGNIFICANT CHANGE UP (ref 0–6)
EPI CELLS # UR: 0 /HPF — SIGNIFICANT CHANGE UP
FIBRINOGEN PPP-MCNC: 666 MG/DL — HIGH (ref 350–510)
GAS PNL BLDV: 137 MMOL/L — SIGNIFICANT CHANGE UP (ref 136–145)
GAS PNL BLDV: SIGNIFICANT CHANGE UP
GAS PNL BLDV: SIGNIFICANT CHANGE UP
GLUCOSE BLDV-MCNC: 111 MG/DL — HIGH (ref 70–99)
GLUCOSE SERPL-MCNC: 113 MG/DL — HIGH (ref 70–99)
GLUCOSE UR QL: NEGATIVE — SIGNIFICANT CHANGE UP
HCO3 BLDV-SCNC: 27 MMOL/L — SIGNIFICANT CHANGE UP (ref 21–29)
HCT VFR BLD CALC: 34 % — LOW (ref 34.5–45)
HCT VFR BLDA CALC: 35 % — LOW (ref 39–50)
HGB BLD CALC-MCNC: 11.3 G/DL — LOW (ref 11.5–15.5)
HGB BLD-MCNC: 12 G/DL — SIGNIFICANT CHANGE UP (ref 11.5–15.5)
HOROWITZ INDEX BLDV+IHG-RTO: SIGNIFICANT CHANGE UP
HYALINE CASTS # UR AUTO: 0 /LPF — SIGNIFICANT CHANGE UP (ref 0–2)
INR BLD: 0.9 RATIO — SIGNIFICANT CHANGE UP (ref 0.88–1.16)
KETONES UR-MCNC: NEGATIVE — SIGNIFICANT CHANGE UP
LACTATE BLDV-MCNC: 1.8 MMOL/L — SIGNIFICANT CHANGE UP (ref 0.7–2)
LDH SERPL L TO P-CCNC: 278 U/L — HIGH (ref 50–242)
LEUKOCYTE ESTERASE UR-ACNC: NEGATIVE — SIGNIFICANT CHANGE UP
LYMPHOCYTES # BLD AUTO: 2.2 K/UL — SIGNIFICANT CHANGE UP (ref 1–3.3)
LYMPHOCYTES # BLD AUTO: 20.9 % — SIGNIFICANT CHANGE UP (ref 13–44)
MAGNESIUM SERPL-MCNC: 1.6 MG/DL — SIGNIFICANT CHANGE UP (ref 1.6–2.6)
MCHC RBC-ENTMCNC: 30.1 PG — SIGNIFICANT CHANGE UP (ref 27–34)
MCHC RBC-ENTMCNC: 35.3 GM/DL — SIGNIFICANT CHANGE UP (ref 32–36)
MCV RBC AUTO: 85.3 FL — SIGNIFICANT CHANGE UP (ref 80–100)
MONOCYTES # BLD AUTO: 0.5 K/UL — SIGNIFICANT CHANGE UP (ref 0–0.9)
MONOCYTES NFR BLD AUTO: 4.5 % — SIGNIFICANT CHANGE UP (ref 2–14)
NEUTROPHILS # BLD AUTO: 7.6 K/UL — HIGH (ref 1.8–7.4)
NEUTROPHILS NFR BLD AUTO: 71 % — SIGNIFICANT CHANGE UP (ref 43–77)
NITRITE UR-MCNC: NEGATIVE — SIGNIFICANT CHANGE UP
PCO2 BLDV: 42 MMHG — SIGNIFICANT CHANGE UP (ref 35–50)
PH BLDV: 7.42 — SIGNIFICANT CHANGE UP (ref 7.35–7.45)
PH UR: 6.5 — SIGNIFICANT CHANGE UP (ref 5–8)
PHOSPHATE SERPL-MCNC: 4.9 MG/DL — HIGH (ref 2.5–4.5)
PLATELET # BLD AUTO: 247 K/UL — SIGNIFICANT CHANGE UP (ref 150–400)
PO2 BLDV: 60 MMHG — HIGH (ref 25–45)
POTASSIUM BLDV-SCNC: 3.4 MMOL/L — LOW (ref 3.5–5.3)
POTASSIUM SERPL-MCNC: 3.7 MMOL/L — SIGNIFICANT CHANGE UP (ref 3.5–5.3)
POTASSIUM SERPL-SCNC: 3.7 MMOL/L — SIGNIFICANT CHANGE UP (ref 3.5–5.3)
PROT SERPL-MCNC: 6.4 G/DL — SIGNIFICANT CHANGE UP (ref 6–8.3)
PROT UR-MCNC: NEGATIVE — SIGNIFICANT CHANGE UP
PROTHROM AB SERPL-ACNC: 10.2 SEC — SIGNIFICANT CHANGE UP (ref 10–12.9)
RBC # BLD: 3.98 M/UL — SIGNIFICANT CHANGE UP (ref 3.8–5.2)
RBC # FLD: 13.3 % — SIGNIFICANT CHANGE UP (ref 10.3–14.5)
RBC CASTS # UR COMP ASSIST: 2 /HPF — SIGNIFICANT CHANGE UP (ref 0–4)
SAO2 % BLDV: 86 % — SIGNIFICANT CHANGE UP (ref 67–88)
SODIUM SERPL-SCNC: 142 MMOL/L — SIGNIFICANT CHANGE UP (ref 135–145)
SP GR SPEC: 1.01 — SIGNIFICANT CHANGE UP (ref 1.01–1.02)
UROBILINOGEN FLD QL: NEGATIVE — SIGNIFICANT CHANGE UP
WBC # BLD: 10.7 K/UL — HIGH (ref 3.8–10.5)
WBC # FLD AUTO: 10.7 K/UL — HIGH (ref 3.8–10.5)
WBC UR QL: 4 /HPF — SIGNIFICANT CHANGE UP (ref 0–5)

## 2018-12-23 PROCEDURE — 70450 CT HEAD/BRAIN W/O DYE: CPT | Mod: 26

## 2018-12-23 PROCEDURE — 99291 CRITICAL CARE FIRST HOUR: CPT

## 2018-12-23 PROCEDURE — 71045 X-RAY EXAM CHEST 1 VIEW: CPT | Mod: 26

## 2018-12-23 PROCEDURE — 93010 ELECTROCARDIOGRAM REPORT: CPT

## 2018-12-23 RX ORDER — LABETALOL HCL 100 MG
20 TABLET ORAL ONCE
Qty: 0 | Refills: 0 | Status: DISCONTINUED | OUTPATIENT
Start: 2018-12-23 | End: 2018-12-23

## 2018-12-23 RX ORDER — ACETAMINOPHEN 500 MG
975 TABLET ORAL EVERY 6 HOURS
Qty: 0 | Refills: 0 | Status: DISCONTINUED | OUTPATIENT
Start: 2018-12-23 | End: 2018-12-24

## 2018-12-23 RX ORDER — MAGNESIUM SULFATE 500 MG/ML
4 VIAL (ML) INJECTION ONCE
Qty: 0 | Refills: 0 | Status: COMPLETED | OUTPATIENT
Start: 2018-12-23 | End: 2018-12-23

## 2018-12-23 RX ORDER — IBUPROFEN 200 MG
600 TABLET ORAL EVERY 6 HOURS
Qty: 0 | Refills: 0 | Status: DISCONTINUED | OUTPATIENT
Start: 2018-12-23 | End: 2018-12-24

## 2018-12-23 RX ORDER — MAGNESIUM SULFATE 500 MG/ML
2 VIAL (ML) INJECTION
Qty: 40 | Refills: 0 | Status: DISCONTINUED | OUTPATIENT
Start: 2018-12-23 | End: 2018-12-24

## 2018-12-23 RX ORDER — LABETALOL HCL 100 MG
10 TABLET ORAL ONCE
Qty: 0 | Refills: 0 | Status: DISCONTINUED | OUTPATIENT
Start: 2018-12-23 | End: 2018-12-23

## 2018-12-23 RX ORDER — LABETALOL HCL 100 MG
300 TABLET ORAL EVERY 8 HOURS
Qty: 0 | Refills: 0 | Status: DISCONTINUED | OUTPATIENT
Start: 2018-12-23 | End: 2018-12-24

## 2018-12-23 RX ORDER — MAGNESIUM SULFATE 500 MG/ML
2 VIAL (ML) INJECTION ONCE
Qty: 0 | Refills: 0 | Status: DISCONTINUED | OUTPATIENT
Start: 2018-12-23 | End: 2018-12-23

## 2018-12-23 RX ORDER — LABETALOL HCL 100 MG
20 TABLET ORAL ONCE
Qty: 0 | Refills: 0 | Status: COMPLETED | OUTPATIENT
Start: 2018-12-23 | End: 2018-12-23

## 2018-12-23 RX ORDER — SODIUM CHLORIDE 9 MG/ML
500 INJECTION, SOLUTION INTRAVENOUS ONCE
Qty: 0 | Refills: 0 | Status: DISCONTINUED | OUTPATIENT
Start: 2018-12-23 | End: 2018-12-23

## 2018-12-23 RX ORDER — ACETAMINOPHEN 500 MG
975 TABLET ORAL ONCE
Qty: 0 | Refills: 0 | Status: COMPLETED | OUTPATIENT
Start: 2018-12-23 | End: 2018-12-23

## 2018-12-23 RX ADMIN — Medication 300 MILLIGRAM(S): at 23:09

## 2018-12-23 RX ADMIN — Medication 975 MILLIGRAM(S): at 21:32

## 2018-12-23 RX ADMIN — Medication 50 GM/HR: at 23:03

## 2018-12-23 RX ADMIN — Medication 20 MILLIGRAM(S): at 21:04

## 2018-12-23 RX ADMIN — Medication 100 GRAM(S): at 21:30

## 2018-12-23 NOTE — H&P ADULT - NSHPLABSRESULTS_GEN_ALL_CORE
12.0                 142  | 24   | 18           10.7  >-----------< 247     ------------------------< 113                   34.0                 3.7  | 102  | 0.78                                         Ca 9.8   Mg 1.6   Ph 4.9

## 2018-12-23 NOTE — ED ADULT NURSE NOTE - OBJECTIVE STATEMENT
33 YOF A&Ox3 postpartum 3 days came in for elevated blood pressure that began today. pt stated felt dizzy and noticed bp wsa elevated and has never had that before. pt denies sob, chest pain, blurry vision, n/v/d, & headache. pt. lung soounds clear & unlabored, heart sounds within normal limits. leg swelling noted bilaterally of both calves. pt. denies pmh 33 YOF A&Ox3 postpartum 3 days came in for elevated blood pressure that began today. pt stated felt dizzy and noticed bp wsa elevated and has never had that before. pt denies sob, chest pain, blurry vision, n/v/d, & headache. pt. lung sounds clear & unlabored, heart sounds within normal limits. leg swelling noted bilaterally of both calves. pt. denies pmh & psh. pt stated 2 pregnancies natural no difficulties. pt states normal menstrual periods.

## 2018-12-23 NOTE — ED PROVIDER NOTE - ATTENDING CONTRIBUTION TO CARE
attending Briana: 33yF s/p recent uncomplicated vaginal delivery p/w elevated BP and b/l LE edema and mild HA. Denies h/o preeclampsia during pregnancy. Not on meds. On arrival, severe range BPs 160s/110s, mentating, nonfocal, nonpitting edema to bilateral LE. OB at bedside upon initial eval. Will administer labetalol, mag bolus and gtt, labs, ekg and admit

## 2018-12-23 NOTE — H&P ADULT - ASSESSMENT
Pt is a 34 yo  PPD#2  presenting with elevated BPs at home (systolic BP in the 150s) and the feeling of pressure at the back of her head, found to have sPEC.

## 2018-12-23 NOTE — ED CLERICAL - NS ED CLERK NOTE PRE-ARRIVAL INFORMATION; ADDITIONAL PRE-ARRIVAL INFORMATION
CC/Reason For referral:pre eclampsia  Preferred Consultant(if applicable):OB GYN  Who admits for you (if needed):OB team  Do you have documents you would like to fax over?no   Would you still like to speak to an ED attending? elly ob team

## 2018-12-23 NOTE — ED ADULT NURSE REASSESSMENT NOTE - NS ED NURSE REASSESS COMMENT FT1
magnesium completed 22:10, patient has no new complaints. vital signs are stable. pt denies sob, chest pain, n/v/d, headache & blurry vision. pt to be admitted to L&D.

## 2018-12-23 NOTE — H&P ADULT - HISTORY OF PRESENT ILLNESS
Pt is a 32 yo  PPD#2  presenting with elevated BPs at home (systolic BP in the 150s) and the feeling of pressure at the back of her head. She was also having some constant tingling in her fingers when she bends  which she thinks is because of new onset swelling in her hands. Denied blurry vision, amaurosis fugax, RUQ pain, SOB, CP, LE, dysuria, constipation, fever/chills, nausea, vomiting. Lochia wnl.     She had an uncomplicated  on . BPs in the postpartum period wnl. She is breast feeding. She reports good bonding with her .     In the ED she had severe range BPs 160S/110S. She received labetalol 20mg IVP. Repeat BP was 138/74. Pt is a 34 yo  PPD#2  presenting with elevated BPs at home (systolic BP in the 150s) and the feeling of pressure at the back of her head. She was also having some constant tingling in her fingers when she bends  which she thinks is because of new onset swelling in her hands. Denied blurry vision, amaurosis fugax, RUQ pain, SOB, CP, LE, dysuria, constipation, fever/chills, nausea, vomiting. Lochia wnl.     She had an uncomplicated  on . BPs in the postpartum period wnl. She is breast feeding. She reports good bonding with her .     In the ED she had severe range BPs 160S/110S (167/112, 162/108, 160/119). She received labetalol 20mg IVP. Repeat BP was 138/74.

## 2018-12-23 NOTE — H&P ADULT - PROBLEM SELECTOR PLAN 1
-MgSO4 4mg IV bolus with 2mg maintenance   -HELLP labs (cbc, cmp, fibrinogen, LDH, uric acid, coags)   -labetalol 200mg TID  -CT head  -BP monitoring  -admission to [facility]    D/w Dr. Iglesia Walsh, pgy2

## 2018-12-23 NOTE — ED PROVIDER NOTE - OBJECTIVE STATEMENT
33F s/p recent uncomplicated vaginal delivery prior hx of GDM presents with a cc of c/f elevated BP b/l LE edema and mild HA, never had prior hx of preeclampsia, no regular meds. Called outpt OBGYN prior to coming to ED instructed to present to ED for eval. No vaginal bleeding or discharge, no changes in vision no chest back or neck pain. LE swelling sudden onset today. No rash.  Denies n/v/f/c/cp/sob. Denies syncope, lightheadedness, dizziness. Denies chest palpitations, abdominal pain. Denies dysuria, hematuria, hematochezia, BRBPR, tarry stools, diarrhea, constipation.

## 2018-12-23 NOTE — ED ADULT NURSE REASSESSMENT NOTE - NS ED NURSE REASSESS COMMENT FT1
Per MD Rapp and OB Resident Magnesium 4g to go over 30 minutes. Pt. is to then go to CT scan, and then to L&D. Magnesium infusion to be initiated in L&D. Continuous cardiac monitoring maintained. Safety and comfort measures maintained.

## 2018-12-23 NOTE — ED PROVIDER NOTE - CRITICAL CARE PROVIDED
documentation/direct patient care (not related to procedure)/interpretation of diagnostic studies/consultation with other physicians/additional history taking/consult w/ pt's family directly relating to pts condition

## 2018-12-23 NOTE — ED PROVIDER NOTE - MEDICAL DECISION MAKING DETAILS
Vaheshena PGY2: 33F s/p recent uncomplicated vaginal delivery prior hx of GDM presents with a cc of c/f elevated BP b/l LE edema and mild HA, never had prior hx of preeclampsia, no regular meds. Called outpt OBGYN prior to coming to ED instructed to present to ED for eval. exam hypertensive mentating well LE swelling c/f preeclampsia will get labs cxr ekg labatolol mag ob consult admit

## 2018-12-23 NOTE — ED PROVIDER NOTE - PHYSICAL EXAMINATION
GEN APPEARANCE: WDWN, alert and cooperative, non-toxic appearing and in NAD, HTN mentating well   HEAD: Atraumatic, normocephalic   EYES: PERRLa, EOMI, vision grossly intact.   EARS: Gross hearing intact.   NOSE: No nasal discharge, no external evidence of epistaxis.   NECK: Supple  CV: RRR, S1S2, no c/r/m/g. No cyanosis or pallor. Extremities warm, well perfused. Cap refill <2 seconds. No bruits.   LUNGS: CTAB. No wheezing. No rales. No rhonchi. No diminished breath sounds.   ABDOMEN: Soft, NTND. No guarding or rebound. No masses.   MSK: Spine appears normal, no spine point tenderness. No CVA ttp. No joint erythema or tenderness. Normal muscular development. Pelvis stable.  EXTREMITIES: b/l LE peripheral edema tense non pitting. No obvious joint or bony deformity.  NEURO: Alert, follows commands. Weight bearing normal. Speech normal. Sensation and motor normal x4 extremities.   SKIN: Normal color for race, warm, dry and intact. No evidence of rash.  PSYCH: Normal mood and affect.

## 2018-12-24 ENCOUNTER — TRANSCRIPTION ENCOUNTER (OUTPATIENT)
Age: 33
End: 2018-12-24

## 2018-12-24 VITALS
TEMPERATURE: 98 F | HEART RATE: 86 BPM | DIASTOLIC BLOOD PRESSURE: 89 MMHG | SYSTOLIC BLOOD PRESSURE: 128 MMHG | RESPIRATION RATE: 18 BRPM

## 2018-12-24 LAB
ALBUMIN SERPL ELPH-MCNC: 3.1 G/DL — LOW (ref 3.3–5)
ALP SERPL-CCNC: 104 U/L — SIGNIFICANT CHANGE UP (ref 40–120)
ALT FLD-CCNC: 23 U/L — SIGNIFICANT CHANGE UP (ref 10–45)
ANION GAP SERPL CALC-SCNC: 14 MMOL/L — SIGNIFICANT CHANGE UP (ref 5–17)
APTT BLD: 28.9 SEC — SIGNIFICANT CHANGE UP (ref 27.5–36.3)
AST SERPL-CCNC: 30 U/L — SIGNIFICANT CHANGE UP (ref 10–40)
BASOPHILS # BLD AUTO: 0 K/UL — SIGNIFICANT CHANGE UP (ref 0–0.2)
BASOPHILS NFR BLD AUTO: 0.4 % — SIGNIFICANT CHANGE UP (ref 0–2)
BILIRUB SERPL-MCNC: 0.2 MG/DL — SIGNIFICANT CHANGE UP (ref 0.2–1.2)
BUN SERPL-MCNC: 13 MG/DL — SIGNIFICANT CHANGE UP (ref 7–23)
CALCIUM SERPL-MCNC: 8.3 MG/DL — LOW (ref 8.4–10.5)
CHLORIDE SERPL-SCNC: 101 MMOL/L — SIGNIFICANT CHANGE UP (ref 96–108)
CO2 SERPL-SCNC: 24 MMOL/L — SIGNIFICANT CHANGE UP (ref 22–31)
CREAT SERPL-MCNC: 0.65 MG/DL — SIGNIFICANT CHANGE UP (ref 0.5–1.3)
CULTURE RESULTS: SIGNIFICANT CHANGE UP
EOSINOPHIL # BLD AUTO: 0.2 K/UL — SIGNIFICANT CHANGE UP (ref 0–0.5)
EOSINOPHIL NFR BLD AUTO: 2.5 % — SIGNIFICANT CHANGE UP (ref 0–6)
FIBRINOGEN PPP-MCNC: 577 MG/DL — HIGH (ref 350–510)
GLUCOSE SERPL-MCNC: 97 MG/DL — SIGNIFICANT CHANGE UP (ref 70–99)
HCT VFR BLD CALC: 31.1 % — LOW (ref 34.5–45)
HGB BLD-MCNC: 10.9 G/DL — LOW (ref 11.5–15.5)
INR BLD: 0.86 RATIO — LOW (ref 0.88–1.16)
LDH SERPL L TO P-CCNC: 249 U/L — HIGH (ref 50–242)
LYMPHOCYTES # BLD AUTO: 1.6 K/UL — SIGNIFICANT CHANGE UP (ref 1–3.3)
LYMPHOCYTES # BLD AUTO: 18.6 % — SIGNIFICANT CHANGE UP (ref 13–44)
MAGNESIUM SERPL-MCNC: 4.2 MG/DL — HIGH (ref 1.6–2.6)
MAGNESIUM SERPL-MCNC: 6.8 MG/DL — HIGH (ref 1.6–2.6)
MCHC RBC-ENTMCNC: 29.9 PG — SIGNIFICANT CHANGE UP (ref 27–34)
MCHC RBC-ENTMCNC: 35 GM/DL — SIGNIFICANT CHANGE UP (ref 32–36)
MCV RBC AUTO: 85.5 FL — SIGNIFICANT CHANGE UP (ref 80–100)
MONOCYTES # BLD AUTO: 0.5 K/UL — SIGNIFICANT CHANGE UP (ref 0–0.9)
MONOCYTES NFR BLD AUTO: 5.3 % — SIGNIFICANT CHANGE UP (ref 2–14)
NEUTROPHILS # BLD AUTO: 6.3 K/UL — SIGNIFICANT CHANGE UP (ref 1.8–7.4)
NEUTROPHILS NFR BLD AUTO: 73.3 % — SIGNIFICANT CHANGE UP (ref 43–77)
PLATELET # BLD AUTO: 234 K/UL — SIGNIFICANT CHANGE UP (ref 150–400)
POTASSIUM SERPL-MCNC: 3.7 MMOL/L — SIGNIFICANT CHANGE UP (ref 3.5–5.3)
POTASSIUM SERPL-SCNC: 3.7 MMOL/L — SIGNIFICANT CHANGE UP (ref 3.5–5.3)
PROT SERPL-MCNC: 5.9 G/DL — LOW (ref 6–8.3)
PROTHROM AB SERPL-ACNC: 9.8 SEC — LOW (ref 10–12.9)
RBC # BLD: 3.64 M/UL — LOW (ref 3.8–5.2)
RBC # FLD: 13.3 % — SIGNIFICANT CHANGE UP (ref 10.3–14.5)
SODIUM SERPL-SCNC: 139 MMOL/L — SIGNIFICANT CHANGE UP (ref 135–145)
SPECIMEN SOURCE: SIGNIFICANT CHANGE UP
URATE SERPL-MCNC: 5.1 MG/DL — SIGNIFICANT CHANGE UP (ref 2.5–7)
WBC # BLD: 8.6 K/UL — SIGNIFICANT CHANGE UP (ref 3.8–10.5)
WBC # FLD AUTO: 8.6 K/UL — SIGNIFICANT CHANGE UP (ref 3.8–10.5)

## 2018-12-24 PROCEDURE — 99291 CRITICAL CARE FIRST HOUR: CPT | Mod: 25

## 2018-12-24 PROCEDURE — 71045 X-RAY EXAM CHEST 1 VIEW: CPT

## 2018-12-24 PROCEDURE — 83605 ASSAY OF LACTIC ACID: CPT

## 2018-12-24 PROCEDURE — 85027 COMPLETE CBC AUTOMATED: CPT

## 2018-12-24 PROCEDURE — 83735 ASSAY OF MAGNESIUM: CPT

## 2018-12-24 PROCEDURE — 80053 COMPREHEN METABOLIC PANEL: CPT

## 2018-12-24 PROCEDURE — 84295 ASSAY OF SERUM SODIUM: CPT

## 2018-12-24 PROCEDURE — 82330 ASSAY OF CALCIUM: CPT

## 2018-12-24 PROCEDURE — 85730 THROMBOPLASTIN TIME PARTIAL: CPT

## 2018-12-24 PROCEDURE — 85384 FIBRINOGEN ACTIVITY: CPT

## 2018-12-24 PROCEDURE — 85014 HEMATOCRIT: CPT

## 2018-12-24 PROCEDURE — 81001 URINALYSIS AUTO W/SCOPE: CPT

## 2018-12-24 PROCEDURE — 84100 ASSAY OF PHOSPHORUS: CPT

## 2018-12-24 PROCEDURE — 70450 CT HEAD/BRAIN W/O DYE: CPT

## 2018-12-24 PROCEDURE — 82947 ASSAY GLUCOSE BLOOD QUANT: CPT

## 2018-12-24 PROCEDURE — 84550 ASSAY OF BLOOD/URIC ACID: CPT

## 2018-12-24 PROCEDURE — 82435 ASSAY OF BLOOD CHLORIDE: CPT

## 2018-12-24 PROCEDURE — 85610 PROTHROMBIN TIME: CPT

## 2018-12-24 PROCEDURE — 87086 URINE CULTURE/COLONY COUNT: CPT

## 2018-12-24 PROCEDURE — 83615 LACTATE (LD) (LDH) ENZYME: CPT

## 2018-12-24 PROCEDURE — 93005 ELECTROCARDIOGRAM TRACING: CPT

## 2018-12-24 PROCEDURE — 82803 BLOOD GASES ANY COMBINATION: CPT

## 2018-12-24 PROCEDURE — 96374 THER/PROPH/DIAG INJ IV PUSH: CPT

## 2018-12-24 PROCEDURE — 84132 ASSAY OF SERUM POTASSIUM: CPT

## 2018-12-24 RX ORDER — SODIUM CHLORIDE 9 MG/ML
1000 INJECTION, SOLUTION INTRAVENOUS
Qty: 0 | Refills: 0 | Status: DISCONTINUED | OUTPATIENT
Start: 2018-12-24 | End: 2018-12-24

## 2018-12-24 RX ORDER — LABETALOL HCL 100 MG
200 TABLET ORAL EVERY 8 HOURS
Qty: 0 | Refills: 0 | Status: DISCONTINUED | OUTPATIENT
Start: 2018-12-24 | End: 2018-12-24

## 2018-12-24 RX ORDER — LABETALOL HCL 100 MG
1 TABLET ORAL
Qty: 90 | Refills: 0
Start: 2018-12-24 | End: 2019-01-22

## 2018-12-24 RX ADMIN — Medication 200 MILLIGRAM(S): at 16:04

## 2018-12-24 RX ADMIN — Medication 975 MILLIGRAM(S): at 06:40

## 2018-12-24 RX ADMIN — Medication 300 MILLIGRAM(S): at 07:32

## 2018-12-24 RX ADMIN — Medication 600 MILLIGRAM(S): at 02:00

## 2018-12-24 RX ADMIN — Medication 975 MILLIGRAM(S): at 13:19

## 2018-12-24 RX ADMIN — Medication 600 MILLIGRAM(S): at 08:57

## 2018-12-24 RX ADMIN — Medication 975 MILLIGRAM(S): at 16:04

## 2018-12-24 RX ADMIN — Medication 600 MILLIGRAM(S): at 01:15

## 2018-12-24 NOTE — DISCHARGE NOTE ADULT - CARE PROVIDER_API CALL
Oh, Jahaira MEJIA), Obstetrics and Gynecology  [address] [place] [address]  Phone: [phone]  Fax: [phone]

## 2018-12-24 NOTE — DISCHARGE NOTE ADULT - CARE PLAN
Principal Discharge DX:	Pre-eclampsia, antepartum  Goal:	return to normal state of health  Assessment and plan of treatment:	Check BP twice daily at home. Take labetalol as directed, hold for low BP <120 systolic. Follow up in 2-3 days for a BP check. Call with severe headaches, NV, uncontrolled abdominal pain, or BP>160/110. Principal Discharge DX:	Pre-eclampsia, antepartum  Goal:	return to normal state of health  Assessment and plan of treatment:	Check BP twice daily at home. Take labetalol as directed, hold for low BP <120 systolic. Follow up in 2-3 days for a BP check. Call with severe headaches, NV, uncontrolled abdominal pain, or BP>160/110. Ok to start labetalol on 12/25 after pharmacy opens.

## 2018-12-24 NOTE — PROGRESS NOTE ADULT - SUBJECTIVE AND OBJECTIVE BOX
OB Progress Note PPD#4/HD#2    Subjective:   Pt seen and examined at bedside. No events overnight. Pain well controlled. Patient ambulating. Passing flatus. Tolerating regular diet. Pt denies fever, chills, chest pain, SOB, nausea, vomiting, lightheadedness, dizziness. She denies any HA, changes in vision, epigastric pain. No other complaints at this time.    Objective:  T(F): 97.8 (18 @ 01:05), Max: 98.3 (18 @ 20:31)  HR: 81 (18 @ 03:14) (81 - 90)  BP: 112/74 (18 @ 03:14) (112/74 - 167/112)  RR: 18 (18 @ 03:14) (16 - 21)  SpO2: 97% (18 @ 03:14) (94% - 100%)    MEDICATIONS  (STANDING):  labetalol 300 milliGRAM(s) Oral every 8 hours  magnesium sulfate Infusion 2 Gm/Hr (50 mL/Hr) IV Continuous <Continuous>    MEDICATIONS  (PRN):  acetaminophen   Tablet .. 975 milliGRAM(s) Oral every 6 hours PRN Moderate Pain (4 - 6)  ibuprofen  Tablet. 600 milliGRAM(s) Oral every 6 hours PRN Mild Pain (1 - 3)      Physical Exam:  Constitutional: NAD, A+O x3  CV: RRR  Lungs: clear to auscultation bilaterally  Abdomen: soft, nondistended, no guarding, no rebound  Extremities: no lower extremity edema or calf tenderness bilaterally; venodynes in place    LABS:        142    |  102    |  18     ----------------------------<  113<H>  3.7     |  24     |  0.78     Ca    9.8        23 Dec 2018 20:59  Phos  4.9       -  Mg     4.2       12-24  Mg     1.6           TPro  6.4    /  Alb  3.3    /  TBili  0.2    /  DBili  x      /  AST  34     /  ALT  27     /  AlkPhos  123<H>          PT/INR - ( 23 Dec 2018 21:10 )   PT: 10.2 sec;   INR: 0.90 ratio         PTT - ( 23 Dec 2018 21:10 )  PTT:28.9 sec  Urinalysis Basic - ( 23 Dec 2018 22:48 )    Color: Colorless / Appearance: Clear / S.011 / pH: x  Gluc: x / Ketone: Negative  / Bili: Negative / Urobili: Negative   Blood: x / Protein: Negative / Nitrite: Negative   Leuk Esterase: Negative / RBC: 2 /hpf / WBC 4 /hpf   Sq Epi: x / Non Sq Epi: 0 /hpf / Bacteria: Negative      Head CT (): wnl

## 2018-12-24 NOTE — DISCHARGE NOTE ADULT - MEDICATION SUMMARY - MEDICATIONS TO TAKE
I will START or STAY ON the medications listed below when I get home from the hospital:    acetaminophen 325 mg oral tablet  -- 3 tab(s) by mouth every 6 hours  -- Indication: For Pain    ibuprofen 600 mg oral tablet  -- 1 tab(s) by mouth every 6 hours  -- Indication: For Pain    labetalol 200 mg oral tablet  -- 1 tab(s) by mouth every 8 hours  -- Indication: For blood pressure    PreNata  -- Indication: For vitamin    docusate sodium 100 mg oral capsule  -- 1 cap(s) by mouth 2 times a day, As needed, Stool Softening  -- Indication: For constipation    simethicone 80 mg oral tablet, chewable  -- 1 tab(s) by mouth every 6 hours, As needed, Gas  -- Indication: For gas

## 2018-12-24 NOTE — DISCHARGE NOTE ADULT - HOSPITAL COURSE
Pt readmitted postpartum day 4 with elevated BPs requiring 1 IV push 20 labetalol. HELLP labs WNL x2. Mg given for >12 hrs, pt noted to have BPs in 1teens/60s. Discharged home in stable condition with follow up BP check in the office.

## 2018-12-24 NOTE — DISCHARGE NOTE ADULT - PLAN OF CARE
return to normal state of health Check BP twice daily at home. Take labetalol as directed, hold for low BP <120 systolic. Follow up in 2-3 days for a BP check. Call with severe headaches, NV, uncontrolled abdominal pain, or BP>160/110. Check BP twice daily at home. Take labetalol as directed, hold for low BP <120 systolic. Follow up in 2-3 days for a BP check. Call with severe headaches, NV, uncontrolled abdominal pain, or BP>160/110. Ok to start labetalol on 12/25 after pharmacy opens.

## 2018-12-24 NOTE — DISCHARGE NOTE ADULT - PATIENT PORTAL LINK FT
You can access the [facility] Patient Portal, offered by [facility], by registering with the following website: [email or web address]

## 2018-12-24 NOTE — PROGRESS NOTE ADULT - ASSESSMENT
33y female PPD#4/HD#2 a/w PP sPEC, now on Mg for seizure ppx and started on PO BP medication for BP control. Pt's BPs controlled overnight and pt is asymptomatic at this time.

## 2018-12-24 NOTE — DISCHARGE NOTE ADULT - ADDITIONAL INSTRUCTIONS
Please follow up with your OB/GYN to schedule and attend your postpartum visit.  Call the doctor immediately for increased vaginal bleeding, temperature greater than 100 degrees Farenheit, headache or blurry vision.   No sex, no tampons, douching or anything placed into vagina.

## 2018-12-24 NOTE — PROGRESS NOTE ADULT - PROBLEM SELECTOR PLAN 1
-c/w Mg for seizure ppx x 24 hrs  -c/w Labetalol 300 TID for BP control  -continue to monitor for S&S of sPEC  -reg diet  -PO pain meds prn  -SCDs, ambulation for DVT ppx    Sandra, pgy3

## 2019-05-16 NOTE — PATIENT PROFILE OB - PRO INTERPRETER NEED 2
[facility]  [address]  [facility] [address]  Phone: [phone]    05/16/19    Sudhir Wright  [address]  [place] [address]    To whom it may concern:     Sudhir Wright is under my medical care. Please excuse him from school from 5/13/2019-5/17/2019 due to acute illness. He saw me 5/10/2019 and one of my colleagues 5/15/2019 for this. Please call the clinic with questions/concerns at [phone].    Sincerely,          Andrew Pederson MD    Clinic Hours:    Monday  7:00am-5:00pm  Tuesday  7:00am-7:00pm  Wednesday  7:00am-5:00pm  Thursday  7:00am-5:00pm  Friday   7:00am-5:00pm        
English

## 2020-07-16 NOTE — DISCHARGE NOTE ADULT - PROVIDER RX CONTACT NUMBER
PRINCIPAL DISCHARGE DIAGNOSIS  Diagnosis: Cannabinoid hyperemesis syndrome  Assessment and Plan of Treatment:
[phone]

## 2021-02-06 NOTE — ED PROVIDER NOTE - CARE PLAN
Principal Discharge DX:	Preeclampsia
impaired coordination/decreased flexibility/impaired postural control/decreased strength

## 2021-11-09 NOTE — PATIENT PROFILE OB - FUNCTIONAL SCREEN CURRENT LEVEL: EATING, MLM
0 = independent V-Y Plasty Text: The defect edges were debeveled with a #15 scalpel blade.  Given the location of the defect, shape of the defect and the proximity to free margins an V-Y advancement flap was deemed most appropriate.  Using a sterile surgical marker, an appropriate advancement flap was drawn incorporating the defect and placing the expected incisions within the relaxed skin tension lines where possible.    The area thus outlined was incised deep to adipose tissue with a #15 scalpel blade.  The skin margins were undermined to an appropriate distance in all directions utilizing iris scissors.

## 2022-04-12 ENCOUNTER — APPOINTMENT (OUTPATIENT)
Dept: ANTEPARTUM | Facility: CLINIC | Age: 37
End: 2022-04-12
Payer: COMMERCIAL

## 2022-04-12 ENCOUNTER — ASOB RESULT (OUTPATIENT)
Age: 37
End: 2022-04-12

## 2022-04-12 PROCEDURE — 76801 OB US < 14 WKS SINGLE FETUS: CPT

## 2022-04-12 PROCEDURE — 76817 TRANSVAGINAL US OBSTETRIC: CPT

## 2022-04-13 ENCOUNTER — OUTPATIENT (OUTPATIENT)
Dept: OUTPATIENT SERVICES | Facility: HOSPITAL | Age: 37
LOS: 1 days | End: 2022-04-13
Payer: COMMERCIAL

## 2022-04-13 ENCOUNTER — TRANSCRIPTION ENCOUNTER (OUTPATIENT)
Age: 37
End: 2022-04-13

## 2022-04-13 VITALS
HEIGHT: 62 IN | TEMPERATURE: 99 F | DIASTOLIC BLOOD PRESSURE: 85 MMHG | WEIGHT: 167.99 LBS | HEART RATE: 102 BPM | SYSTOLIC BLOOD PRESSURE: 123 MMHG | RESPIRATION RATE: 16 BRPM | OXYGEN SATURATION: 100 %

## 2022-04-13 DIAGNOSIS — O02.1 MISSED ABORTION: ICD-10-CM

## 2022-04-13 DIAGNOSIS — Z11.52 ENCOUNTER FOR SCREENING FOR COVID-19: ICD-10-CM

## 2022-04-13 LAB
ANION GAP SERPL CALC-SCNC: 13 MMOL/L — SIGNIFICANT CHANGE UP (ref 5–17)
BLD GP AB SCN SERPL QL: NEGATIVE — SIGNIFICANT CHANGE UP
BUN SERPL-MCNC: 9 MG/DL — SIGNIFICANT CHANGE UP (ref 7–23)
CALCIUM SERPL-MCNC: 9.3 MG/DL — SIGNIFICANT CHANGE UP (ref 8.4–10.5)
CHLORIDE SERPL-SCNC: 101 MMOL/L — SIGNIFICANT CHANGE UP (ref 96–108)
CO2 SERPL-SCNC: 23 MMOL/L — SIGNIFICANT CHANGE UP (ref 22–31)
CREAT SERPL-MCNC: 0.52 MG/DL — SIGNIFICANT CHANGE UP (ref 0.5–1.3)
EGFR: 123 ML/MIN/1.73M2 — SIGNIFICANT CHANGE UP
GLUCOSE SERPL-MCNC: 90 MG/DL — SIGNIFICANT CHANGE UP (ref 70–99)
HCT VFR BLD CALC: 44.6 % — SIGNIFICANT CHANGE UP (ref 34.5–45)
HGB BLD-MCNC: 14.6 G/DL — SIGNIFICANT CHANGE UP (ref 11.5–15.5)
MCHC RBC-ENTMCNC: 26.4 PG — LOW (ref 27–34)
MCHC RBC-ENTMCNC: 32.7 GM/DL — SIGNIFICANT CHANGE UP (ref 32–36)
MCV RBC AUTO: 80.8 FL — SIGNIFICANT CHANGE UP (ref 80–100)
NRBC # BLD: 0 /100 WBCS — SIGNIFICANT CHANGE UP (ref 0–0)
PLATELET # BLD AUTO: 282 K/UL — SIGNIFICANT CHANGE UP (ref 150–400)
POTASSIUM SERPL-MCNC: 3.4 MMOL/L — LOW (ref 3.5–5.3)
POTASSIUM SERPL-SCNC: 3.4 MMOL/L — LOW (ref 3.5–5.3)
RBC # BLD: 5.52 M/UL — HIGH (ref 3.8–5.2)
RBC # FLD: 14.8 % — HIGH (ref 10.3–14.5)
RH IG SCN BLD-IMP: POSITIVE — SIGNIFICANT CHANGE UP
SARS-COV-2 RNA SPEC QL NAA+PROBE: SIGNIFICANT CHANGE UP
SODIUM SERPL-SCNC: 137 MMOL/L — SIGNIFICANT CHANGE UP (ref 135–145)
WBC # BLD: 6.37 K/UL — SIGNIFICANT CHANGE UP (ref 3.8–10.5)
WBC # FLD AUTO: 6.37 K/UL — SIGNIFICANT CHANGE UP (ref 3.8–10.5)

## 2022-04-13 PROCEDURE — 80048 BASIC METABOLIC PNL TOTAL CA: CPT

## 2022-04-13 PROCEDURE — 86900 BLOOD TYPING SEROLOGIC ABO: CPT

## 2022-04-13 PROCEDURE — 86901 BLOOD TYPING SEROLOGIC RH(D): CPT

## 2022-04-13 PROCEDURE — G0463: CPT

## 2022-04-13 PROCEDURE — U0005: CPT

## 2022-04-13 PROCEDURE — 86850 RBC ANTIBODY SCREEN: CPT

## 2022-04-13 PROCEDURE — 36415 COLL VENOUS BLD VENIPUNCTURE: CPT

## 2022-04-13 PROCEDURE — 85027 COMPLETE CBC AUTOMATED: CPT

## 2022-04-13 PROCEDURE — C9803: CPT

## 2022-04-13 PROCEDURE — U0003: CPT

## 2022-04-13 RX ORDER — SODIUM CHLORIDE 9 MG/ML
3 INJECTION INTRAMUSCULAR; INTRAVENOUS; SUBCUTANEOUS EVERY 8 HOURS
Refills: 0 | Status: DISCONTINUED | OUTPATIENT
Start: 2022-04-15 | End: 2022-04-29

## 2022-04-13 RX ORDER — SODIUM CHLORIDE 9 MG/ML
1000 INJECTION, SOLUTION INTRAVENOUS
Refills: 0 | Status: DISCONTINUED | OUTPATIENT
Start: 2022-04-15 | End: 2022-04-29

## 2022-04-13 NOTE — H&P PST ADULT - NSANTHOSAYNRD_GEN_A_CORE
No. MUMTAZ screening performed.  STOP BANG Legend: 0-2 = LOW Risk; 3-4 = INTERMEDIATE Risk; 5-8 = HIGH Risk

## 2022-04-13 NOTE — H&P PST ADULT - PROBLEM SELECTOR PLAN 1
scheduled D&C for missed  on 4/15/22.  -preop instructions given  -labs: CBC, BMP, T&S done in PST  DOS: ABO

## 2022-04-13 NOTE — H&P PST ADULT - HISTORY OF PRESENT ILLNESS
36 yr old female with PMH of  36 yr old female  (LMP: 22) with PMH of preeclampsia, gestational DM, NVD ( , 2018: epidural). Pt reports 1st OB visit on , with US reveal no fetal HR at 8 weeks gestation. Pt denies pelvic pian, or vaginal bleeding at this time. mPt evaluated by Dr. Sanz for a scheduled D&C for missed  on 4/15/22. Pt denies recent travel, sick contact, or covid infection.    **Covid swab on  at [facility]

## 2022-04-13 NOTE — H&P PST ADULT - NSICDXFAMILYHX_GEN_ALL_CORE_FT
FAMILY HISTORY:  Father  Still living? Unknown  Family history of diabetes mellitus (DM), Age at diagnosis: Age Unknown  FH: HTN (hypertension), Age at diagnosis: Age Unknown    Mother  Still living? Yes, Estimated age: Age Unknown  Family history of diabetes mellitus (DM), Age at diagnosis: Age Unknown  FH: HTN (hypertension), Age at diagnosis: Age Unknown

## 2022-04-13 NOTE — H&P PST ADULT - FALL HARM RISK - TYPE OF ASSESSMENT
Hatchet Flap Text: The defect edges were debeveled with a #15 scalpel blade.  Given the location of the defect, shape of the defect and the proximity to free margins a hatchet flap was deemed most appropriate.  Using a sterile surgical marker, an appropriate hatchet flap was drawn incorporating the defect and placing the expected incisions within the relaxed skin tension lines where possible.    The area thus outlined was incised deep to adipose tissue with a #15 scalpel blade.  The skin margins were undermined to an appropriate distance in all directions utilizing iris scissors. Admission

## 2022-04-14 ENCOUNTER — TRANSCRIPTION ENCOUNTER (OUTPATIENT)
Age: 37
End: 2022-04-14

## 2022-04-15 ENCOUNTER — TRANSCRIPTION ENCOUNTER (OUTPATIENT)
Age: 37
End: 2022-04-15

## 2022-04-15 ENCOUNTER — OUTPATIENT (OUTPATIENT)
Dept: OUTPATIENT SERVICES | Facility: HOSPITAL | Age: 37
LOS: 1 days | End: 2022-04-15
Payer: COMMERCIAL

## 2022-04-15 ENCOUNTER — RESULT REVIEW (OUTPATIENT)
Age: 37
End: 2022-04-15

## 2022-04-15 VITALS
HEIGHT: 62 IN | OXYGEN SATURATION: 100 % | DIASTOLIC BLOOD PRESSURE: 83 MMHG | RESPIRATION RATE: 16 BRPM | HEART RATE: 92 BPM | SYSTOLIC BLOOD PRESSURE: 120 MMHG | TEMPERATURE: 97 F | WEIGHT: 167.99 LBS

## 2022-04-15 VITALS
DIASTOLIC BLOOD PRESSURE: 80 MMHG | RESPIRATION RATE: 17 BRPM | OXYGEN SATURATION: 99 % | HEART RATE: 92 BPM | SYSTOLIC BLOOD PRESSURE: 116 MMHG

## 2022-04-15 DIAGNOSIS — O02.1 MISSED ABORTION: ICD-10-CM

## 2022-04-15 PROCEDURE — 88233 TISSUE CULTURE SKIN/BIOPSY: CPT

## 2022-04-15 PROCEDURE — 86850 RBC ANTIBODY SCREEN: CPT

## 2022-04-15 PROCEDURE — 86901 BLOOD TYPING SEROLOGIC RH(D): CPT

## 2022-04-15 PROCEDURE — 86900 BLOOD TYPING SEROLOGIC ABO: CPT

## 2022-04-15 PROCEDURE — 59820 CARE OF MISCARRIAGE: CPT

## 2022-04-15 PROCEDURE — 88305 TISSUE EXAM BY PATHOLOGIST: CPT

## 2022-04-15 PROCEDURE — 88264 CHROMOSOME ANALYSIS 20-25: CPT

## 2022-04-15 PROCEDURE — 88305 TISSUE EXAM BY PATHOLOGIST: CPT | Mod: 26

## 2022-04-15 PROCEDURE — 88280 CHROMOSOME KARYOTYPE STUDY: CPT

## 2022-04-15 RX ORDER — IBUPROFEN 200 MG
600 TABLET ORAL ONCE
Refills: 0 | Status: DISCONTINUED | OUTPATIENT
Start: 2022-04-15 | End: 2022-04-29

## 2022-04-15 RX ORDER — IBUPROFEN 200 MG
1 TABLET ORAL
Qty: 0 | Refills: 0 | DISCHARGE
Start: 2022-04-15

## 2022-04-15 RX ORDER — LIDOCAINE HCL 20 MG/ML
0.2 VIAL (ML) INJECTION ONCE
Refills: 0 | Status: COMPLETED | OUTPATIENT
Start: 2022-04-15 | End: 2022-04-15

## 2022-04-15 RX ADMIN — SODIUM CHLORIDE 100 MILLILITER(S): 9 INJECTION, SOLUTION INTRAVENOUS at 07:14

## 2022-04-15 NOTE — ASU PATIENT PROFILE, ADULT - FALL HARM RISK - UNIVERSAL INTERVENTIONS
Bed in lowest position, wheels locked, appropriate side rails in place/Call bell, personal items and telephone in reach/Instruct patient to call for assistance before getting out of bed or chair/Non-slip footwear when patient is out of bed/[place] to call system/Physically safe environment - no spills, clutter or unnecessary equipment/Purposeful Proactive Rounding/Room/bathroom lighting operational, light cord in reach

## 2022-04-15 NOTE — BRIEF OPERATIVE NOTE - NSICDXBRIEFPROCEDURE_GEN_ALL_CORE_FT
PROCEDURES:  Dilation and evacuation, uterus, using suction curettage if indicated, with chromosomal analysis if indicated 15-Apr-2022 09:06:28  Jahaira Sanz

## 2022-04-15 NOTE — ASU DISCHARGE PLAN (ADULT/PEDIATRIC) - NURSING INSTRUCTIONS
OK to take Tylenol/Acetaminophen at 2:30PM TODAY 4/15 for pain and every 6 hours after as needed. OK to take Motrin/Ibuprofen at ANY TIME TODAY 4/15 for pain and every 6 hours after as needed.

## 2022-04-15 NOTE — ASU DISCHARGE PLAN (ADULT/PEDIATRIC) - NS MD DC FALL RISK RISK
For information on Fall & Injury Prevention, visit: [email or web address] OR  [email or web address] OR  https://www.cdc.gov/steadi/patient.html

## 2022-04-26 LAB — SURGICAL PATHOLOGY STUDY: SIGNIFICANT CHANGE UP

## 2022-05-09 LAB — CHROM ANALY OVERALL INTERP SPEC-IMP: SIGNIFICANT CHANGE UP

## 2022-06-23 PROBLEM — Z00.00 ENCOUNTER FOR PREVENTIVE HEALTH EXAMINATION: Noted: 2022-06-23

## 2022-08-08 ENCOUNTER — ASOB RESULT (OUTPATIENT)
Age: 37
End: 2022-08-08

## 2022-08-08 ENCOUNTER — LABORATORY RESULT (OUTPATIENT)
Age: 37
End: 2022-08-08

## 2022-08-08 ENCOUNTER — APPOINTMENT (OUTPATIENT)
Dept: ANTEPARTUM | Facility: CLINIC | Age: 37
End: 2022-08-08

## 2022-08-08 PROCEDURE — 76813 OB US NUCHAL MEAS 1 GEST: CPT | Mod: 59

## 2022-08-08 PROCEDURE — 76801 OB US < 14 WKS SINGLE FETUS: CPT

## 2022-08-24 NOTE — H&P PST ADULT - NSANTHSNORERD_ENT_A_CORE
Patient presents today for a nurse visit for Hep B vaccine.    Administered into L deltoid. No issues.    Next vaccine due in 6 months along with Hep A to complete both series.     Appt scheduled for 1/17/2023.    Patient left clinic ambulatory.    Yes

## 2022-10-04 ENCOUNTER — ASOB RESULT (OUTPATIENT)
Age: 37
End: 2022-10-04

## 2022-10-04 ENCOUNTER — APPOINTMENT (OUTPATIENT)
Dept: ANTEPARTUM | Facility: CLINIC | Age: 37
End: 2022-10-04

## 2022-10-04 PROCEDURE — 76811 OB US DETAILED SNGL FETUS: CPT

## 2022-10-05 DIAGNOSIS — O13.2 GESTATIONAL [PREGNANCY-INDUCED] HYPERTENSION W/OUT SIGNIFICANT PROTEINURIA, SECOND TRIMESTER: ICD-10-CM

## 2022-10-05 DIAGNOSIS — Z87.59 PERSONAL HISTORY OF OTHER COMPLICATIONS OF PREGNANCY, CHILDBIRTH AND THE PUERPERIUM: ICD-10-CM

## 2022-10-05 DIAGNOSIS — Z86.32 PERSONAL HISTORY OF GESTATIONAL DIABETES: ICD-10-CM

## 2022-10-13 ENCOUNTER — APPOINTMENT (OUTPATIENT)
Dept: ANTEPARTUM | Facility: CLINIC | Age: 37
End: 2022-10-13

## 2022-10-13 ENCOUNTER — ASOB RESULT (OUTPATIENT)
Age: 37
End: 2022-10-13

## 2022-10-13 PROBLEM — O14.90 UNSPECIFIED PRE-ECLAMPSIA, UNSPECIFIED TRIMESTER: Chronic | Status: ACTIVE | Noted: 2022-04-13

## 2022-10-13 PROBLEM — O24.419 GESTATIONAL DIABETES MELLITUS IN PREGNANCY, UNSPECIFIED CONTROL: Chronic | Status: ACTIVE | Noted: 2022-04-13

## 2022-10-13 PROCEDURE — 76816 OB US FOLLOW-UP PER FETUS: CPT

## 2022-10-20 ENCOUNTER — APPOINTMENT (OUTPATIENT)
Dept: MATERNAL FETAL MEDICINE | Facility: CLINIC | Age: 37
End: 2022-10-20

## 2022-10-20 ENCOUNTER — ASOB RESULT (OUTPATIENT)
Age: 37
End: 2022-10-20

## 2022-10-24 DIAGNOSIS — Z87.59 PERSONAL HISTORY OF OTHER COMPLICATIONS OF PREGNANCY, CHILDBIRTH AND THE PUERPERIUM: ICD-10-CM

## 2022-10-24 RX ORDER — PNV/FERROUS SULFATE/FOLIC ACID 27-<0.5MG
TABLET ORAL
Refills: 0 | Status: ACTIVE | COMMUNITY

## 2022-10-24 RX ORDER — ASPIRIN ENTERIC COATED TABLETS 81 MG 81 MG/1
81 TABLET, DELAYED RELEASE ORAL
Refills: 0 | Status: ACTIVE | COMMUNITY

## 2022-10-26 ENCOUNTER — APPOINTMENT (OUTPATIENT)
Dept: CARDIOLOGY | Facility: CLINIC | Age: 37
End: 2022-10-26

## 2022-10-26 ENCOUNTER — NON-APPOINTMENT (OUTPATIENT)
Age: 37
End: 2022-10-26

## 2022-10-26 VITALS
WEIGHT: 176 LBS | BODY MASS INDEX: 32.39 KG/M2 | SYSTOLIC BLOOD PRESSURE: 127 MMHG | HEART RATE: 103 BPM | OXYGEN SATURATION: 96 % | HEIGHT: 62 IN | DIASTOLIC BLOOD PRESSURE: 86 MMHG

## 2022-10-26 PROCEDURE — 99204 OFFICE O/P NEW MOD 45 MIN: CPT

## 2022-10-26 PROCEDURE — 93000 ELECTROCARDIOGRAM COMPLETE: CPT

## 2022-10-26 NOTE — DISCUSSION/SUMMARY
[EKG obtained to assist in diagnosis and management of assessed problem(s)] : EKG obtained to assist in diagnosis and management of assessed problem(s) [FreeTextEntry1] : Ms. SANDER UMANA 37 year old F with h/o PEC is here Oct 26th, 2022 to establish care in the Women's heart health program.\par \par 1st pregnancy - 2015 - gestational DM - nl vaginal delivery \par 2bs pregnancy - 2018 - PEC post pasrtum - was on procardia and labetalol for several months - nl vaginal\par 3rd pregnancy - 4/2022 - miscarruage\par 4th - pregnancy - curerntly 24 weeks GA - ? gestational HTN (eleavted BPs in office 140/90s) not on any BP meds\par \par feels occasional palpitations\par \par asymptomatic - denies chest pain, SOB/COLBERT, lightheadedness or syncope.denies HA, blurry vision\par \par - will refer the patient for echocardiogram to assess LV function\par - BP stable. Encouraged the patient to monitor blood pressure at home, keep a log, and report results back to us for evaluation. Based on results, we will adjust the regimen as necessary.\par - ECG with no acute ischemic changes \par - on ASA daily\par - hydration encouraged\par - encouraged to avoid stimulant\par - Encouraged patient to continue healthy exercise and eating habits, focusing on a Mediterranean style of eating and aiming for the recommended 150 minutes per week of moderate physical activity.\par \par \par  Katt Li

## 2022-10-26 NOTE — HISTORY OF PRESENT ILLNESS
[FreeTextEntry1] : Ms. SANDER UMANA 37 year old F with h/o PEC is here Oct 26th, 2022 to establish care in the Women's heart health program.\par \par 1st pregnancy - 2015 - gestational DM - nl vaginal delivery \par 2bs pregnancy - 2018 - PEC post pasrtum - was on procardia and labetalol for several months - nl vaginal\par 3rd pregnancy - 4/2022 - miscarruage\par 4th - pregnancy - curerntly 24 weeks GA - ? gestational HTN (eleavted BPs in office 140/90s) not on any BP meds\par \par feels occasional palpitations\par \par asymptomatic - denies chest pain, SOB/COLBERT, lightheadedness or syncope.denies HA, blurry vision\par

## 2022-12-02 ENCOUNTER — ASOB RESULT (OUTPATIENT)
Age: 37
End: 2022-12-02

## 2022-12-02 ENCOUNTER — APPOINTMENT (OUTPATIENT)
Dept: ANTEPARTUM | Facility: CLINIC | Age: 37
End: 2022-12-02

## 2022-12-02 PROCEDURE — 76816 OB US FOLLOW-UP PER FETUS: CPT

## 2022-12-10 ENCOUNTER — APPOINTMENT (OUTPATIENT)
Dept: CARDIOLOGY | Facility: CLINIC | Age: 37
End: 2022-12-10
Payer: COMMERCIAL

## 2022-12-10 DIAGNOSIS — R00.2 PALPITATIONS: ICD-10-CM

## 2022-12-10 DIAGNOSIS — R06.09 OTHER FORMS OF DYSPNEA: ICD-10-CM

## 2022-12-10 PROCEDURE — 93306 TTE W/DOPPLER COMPLETE: CPT

## 2022-12-14 ENCOUNTER — ASOB RESULT (OUTPATIENT)
Age: 37
End: 2022-12-14

## 2022-12-14 ENCOUNTER — APPOINTMENT (OUTPATIENT)
Dept: MATERNAL FETAL MEDICINE | Facility: CLINIC | Age: 37
End: 2022-12-14

## 2022-12-14 DIAGNOSIS — O24.419 GESTATIONAL DIABETES MELLITUS IN PREGNANCY, UNSPECIFIED CONTROL: ICD-10-CM

## 2022-12-16 RX ORDER — BLOOD-GLUCOSE METER
KIT MISCELLANEOUS 4 TIMES DAILY
Qty: 2 | Refills: 2 | Status: ACTIVE | COMMUNITY
Start: 2022-12-14 | End: 1900-01-01

## 2022-12-16 RX ORDER — URINE ACETONE TEST STRIPS
STRIP MISCELLANEOUS
Qty: 1 | Refills: 2 | Status: ACTIVE | COMMUNITY
Start: 2022-12-14 | End: 1900-01-01

## 2022-12-16 RX ORDER — LANCETS 33 GAUGE
EACH MISCELLANEOUS
Qty: 4 | Refills: 2 | Status: ACTIVE | COMMUNITY
Start: 2022-12-14 | End: 1900-01-01

## 2022-12-16 RX ORDER — BLOOD-GLUCOSE METER
W/DEVICE KIT MISCELLANEOUS
Qty: 1 | Refills: 0 | Status: ACTIVE | COMMUNITY
Start: 2022-12-14 | End: 1900-01-01

## 2022-12-21 ENCOUNTER — ASOB RESULT (OUTPATIENT)
Age: 37
End: 2022-12-21

## 2022-12-21 ENCOUNTER — APPOINTMENT (OUTPATIENT)
Dept: MATERNAL FETAL MEDICINE | Facility: CLINIC | Age: 37
End: 2022-12-21

## 2022-12-21 PROCEDURE — G0108 DIAB MANAGE TRN  PER INDIV: CPT | Mod: 95

## 2022-12-23 ENCOUNTER — NON-APPOINTMENT (OUTPATIENT)
Age: 37
End: 2022-12-23

## 2022-12-23 RX ORDER — ISOPROPYL ALCOHOL 70 ML/100ML
SWAB TOPICAL
Qty: 4 | Refills: 3 | Status: ACTIVE | COMMUNITY
Start: 2022-12-23 | End: 1900-01-01

## 2023-01-03 ENCOUNTER — APPOINTMENT (OUTPATIENT)
Dept: MATERNAL FETAL MEDICINE | Facility: CLINIC | Age: 38
End: 2023-01-03
Payer: COMMERCIAL

## 2023-01-03 ENCOUNTER — ASOB RESULT (OUTPATIENT)
Age: 38
End: 2023-01-03

## 2023-01-03 PROCEDURE — G0108 DIAB MANAGE TRN  PER INDIV: CPT | Mod: 95

## 2023-01-11 RX ORDER — BLOOD-GLUCOSE METER
W/DEVICE EACH MISCELLANEOUS
Qty: 1 | Refills: 0 | Status: ACTIVE | COMMUNITY
Start: 2023-01-11 | End: 1900-01-01

## 2023-01-11 RX ORDER — LANCETS
EACH MISCELLANEOUS
Qty: 2 | Refills: 1 | Status: ACTIVE | COMMUNITY
Start: 2023-01-11 | End: 1900-01-01

## 2023-01-11 RX ORDER — BLOOD SUGAR DIAGNOSTIC
STRIP MISCELLANEOUS
Qty: 3 | Refills: 3 | Status: ACTIVE | COMMUNITY
Start: 2023-01-11 | End: 1900-01-01

## 2023-01-13 ENCOUNTER — APPOINTMENT (OUTPATIENT)
Dept: MATERNAL FETAL MEDICINE | Facility: CLINIC | Age: 38
End: 2023-01-13
Payer: COMMERCIAL

## 2023-01-13 ENCOUNTER — ASOB RESULT (OUTPATIENT)
Age: 38
End: 2023-01-13

## 2023-01-13 PROCEDURE — G0108 DIAB MANAGE TRN  PER INDIV: CPT | Mod: 95

## 2023-01-13 RX ORDER — INSULIN LISPRO 100 [IU]/ML
100 INJECTION, SOLUTION INTRAVENOUS; SUBCUTANEOUS
Qty: 1 | Refills: 2 | Status: ACTIVE | COMMUNITY
Start: 2023-01-13 | End: 1900-01-01

## 2023-01-14 PROBLEM — R00.2 PALPITATIONS: Status: ACTIVE | Noted: 2022-10-26

## 2023-01-14 PROBLEM — R06.09 DYSPNEA ON EXERTION: Status: ACTIVE | Noted: 2022-10-26

## 2023-01-19 ENCOUNTER — ASOB RESULT (OUTPATIENT)
Age: 38
End: 2023-01-19

## 2023-01-19 ENCOUNTER — APPOINTMENT (OUTPATIENT)
Dept: ANTEPARTUM | Facility: CLINIC | Age: 38
End: 2023-01-19
Payer: COMMERCIAL

## 2023-01-19 PROCEDURE — 76819 FETAL BIOPHYS PROFIL W/O NST: CPT | Mod: 59

## 2023-01-19 PROCEDURE — 76816 OB US FOLLOW-UP PER FETUS: CPT

## 2023-01-20 ENCOUNTER — ASOB RESULT (OUTPATIENT)
Age: 38
End: 2023-01-20

## 2023-01-20 ENCOUNTER — APPOINTMENT (OUTPATIENT)
Dept: MATERNAL FETAL MEDICINE | Facility: CLINIC | Age: 38
End: 2023-01-20
Payer: COMMERCIAL

## 2023-01-20 PROCEDURE — G0108 DIAB MANAGE TRN  PER INDIV: CPT | Mod: 95

## 2023-01-20 RX ORDER — INSULIN HUMAN 100 [IU]/ML
100 INJECTION, SUSPENSION SUBCUTANEOUS
Qty: 1 | Refills: 0 | Status: ACTIVE | COMMUNITY
Start: 2022-12-23 | End: 1900-01-01

## 2023-01-27 ENCOUNTER — APPOINTMENT (OUTPATIENT)
Dept: ANTEPARTUM | Facility: CLINIC | Age: 38
End: 2023-01-27
Payer: COMMERCIAL

## 2023-01-27 ENCOUNTER — ASOB RESULT (OUTPATIENT)
Age: 38
End: 2023-01-27

## 2023-01-27 PROCEDURE — 76819 FETAL BIOPHYS PROFIL W/O NST: CPT

## 2023-02-02 ENCOUNTER — APPOINTMENT (OUTPATIENT)
Dept: MATERNAL FETAL MEDICINE | Facility: CLINIC | Age: 38
End: 2023-02-02
Payer: COMMERCIAL

## 2023-02-02 ENCOUNTER — NON-APPOINTMENT (OUTPATIENT)
Age: 38
End: 2023-02-02

## 2023-02-02 ENCOUNTER — ASOB RESULT (OUTPATIENT)
Age: 38
End: 2023-02-02

## 2023-02-02 PROCEDURE — G0108 DIAB MANAGE TRN  PER INDIV: CPT | Mod: 95

## 2023-02-03 ENCOUNTER — APPOINTMENT (OUTPATIENT)
Dept: MATERNAL FETAL MEDICINE | Facility: CLINIC | Age: 38
End: 2023-02-03

## 2023-02-03 ENCOUNTER — APPOINTMENT (OUTPATIENT)
Dept: ANTEPARTUM | Facility: CLINIC | Age: 38
End: 2023-02-03
Payer: COMMERCIAL

## 2023-02-03 ENCOUNTER — ASOB RESULT (OUTPATIENT)
Age: 38
End: 2023-02-03

## 2023-02-03 PROCEDURE — 76819 FETAL BIOPHYS PROFIL W/O NST: CPT

## 2023-02-06 RX ORDER — PEN NEEDLE, DIABETIC 29 G X1/2"
32G X 4 MM NEEDLE, DISPOSABLE MISCELLANEOUS
Qty: 400 | Refills: 0 | Status: ACTIVE | COMMUNITY
Start: 2022-12-23 | End: 1900-01-01

## 2023-02-08 ENCOUNTER — ASOB RESULT (OUTPATIENT)
Age: 38
End: 2023-02-08

## 2023-02-08 ENCOUNTER — APPOINTMENT (OUTPATIENT)
Dept: ANTEPARTUM | Facility: CLINIC | Age: 38
End: 2023-02-08
Payer: COMMERCIAL

## 2023-02-08 PROCEDURE — 76819 FETAL BIOPHYS PROFIL W/O NST: CPT

## 2023-02-09 ENCOUNTER — INPATIENT (INPATIENT)
Facility: HOSPITAL | Age: 38
LOS: 1 days | Discharge: HOME | End: 2023-02-11
Attending: OBSTETRICS & GYNECOLOGY | Admitting: OBSTETRICS & GYNECOLOGY
Payer: COMMERCIAL

## 2023-02-09 VITALS
RESPIRATION RATE: 18 BRPM | OXYGEN SATURATION: 99 % | HEART RATE: 102 BPM | TEMPERATURE: 98 F | DIASTOLIC BLOOD PRESSURE: 77 MMHG | SYSTOLIC BLOOD PRESSURE: 109 MMHG

## 2023-02-09 DIAGNOSIS — O24.419 GESTATIONAL DIABETES MELLITUS IN PREGNANCY, UNSPECIFIED CONTROL: ICD-10-CM

## 2023-02-09 DIAGNOSIS — Z98.890 OTHER SPECIFIED POSTPROCEDURAL STATES: Chronic | ICD-10-CM

## 2023-02-09 LAB
ALBUMIN SERPL ELPH-MCNC: 3.2 G/DL — LOW (ref 3.3–5)
ALP SERPL-CCNC: 140 U/L — HIGH (ref 40–120)
ALT FLD-CCNC: 11 U/L — SIGNIFICANT CHANGE UP (ref 10–45)
ANION GAP SERPL CALC-SCNC: 10 MMOL/L — SIGNIFICANT CHANGE UP (ref 5–17)
APPEARANCE UR: CLEAR — SIGNIFICANT CHANGE UP
APTT BLD: 27.7 SEC — SIGNIFICANT CHANGE UP (ref 27.5–35.5)
AST SERPL-CCNC: 17 U/L — SIGNIFICANT CHANGE UP (ref 10–40)
BASOPHILS # BLD AUTO: 0.03 K/UL — SIGNIFICANT CHANGE UP (ref 0–0.2)
BASOPHILS # BLD AUTO: 0.04 K/UL — SIGNIFICANT CHANGE UP (ref 0–0.2)
BASOPHILS NFR BLD AUTO: 0.3 % — SIGNIFICANT CHANGE UP (ref 0–2)
BASOPHILS NFR BLD AUTO: 0.4 % — SIGNIFICANT CHANGE UP (ref 0–2)
BILIRUB SERPL-MCNC: 0.2 MG/DL — SIGNIFICANT CHANGE UP (ref 0.2–1.2)
BILIRUB UR-MCNC: NEGATIVE — SIGNIFICANT CHANGE UP
BLD GP AB SCN SERPL QL: NEGATIVE — SIGNIFICANT CHANGE UP
BUN SERPL-MCNC: 7 MG/DL — SIGNIFICANT CHANGE UP (ref 7–23)
CALCIUM SERPL-MCNC: 8.6 MG/DL — SIGNIFICANT CHANGE UP (ref 8.4–10.5)
CHLORIDE SERPL-SCNC: 107 MMOL/L — SIGNIFICANT CHANGE UP (ref 96–108)
CO2 SERPL-SCNC: 23 MMOL/L — SIGNIFICANT CHANGE UP (ref 22–31)
COLOR SPEC: SIGNIFICANT CHANGE UP
COVID-19 SPIKE DOMAIN AB INTERP: POSITIVE
COVID-19 SPIKE DOMAIN ANTIBODY RESULT: >250 U/ML — HIGH
CREAT ?TM UR-MCNC: 79 MG/DL — SIGNIFICANT CHANGE UP
CREAT SERPL-MCNC: 0.57 MG/DL — SIGNIFICANT CHANGE UP (ref 0.5–1.3)
DIFF PNL FLD: NEGATIVE — SIGNIFICANT CHANGE UP
EGFR: 119 ML/MIN/1.73M2 — SIGNIFICANT CHANGE UP
EOSINOPHIL # BLD AUTO: 0.06 K/UL — SIGNIFICANT CHANGE UP (ref 0–0.5)
EOSINOPHIL # BLD AUTO: 0.08 K/UL — SIGNIFICANT CHANGE UP (ref 0–0.5)
EOSINOPHIL NFR BLD AUTO: 0.6 % — SIGNIFICANT CHANGE UP (ref 0–6)
EOSINOPHIL NFR BLD AUTO: 0.8 % — SIGNIFICANT CHANGE UP (ref 0–6)
FIBRINOGEN PPP-MCNC: 396 MG/DL — SIGNIFICANT CHANGE UP (ref 200–445)
GLUCOSE BLDC GLUCOMTR-MCNC: 75 MG/DL — SIGNIFICANT CHANGE UP (ref 70–99)
GLUCOSE BLDC GLUCOMTR-MCNC: 79 MG/DL — SIGNIFICANT CHANGE UP (ref 70–99)
GLUCOSE BLDC GLUCOMTR-MCNC: 81 MG/DL — SIGNIFICANT CHANGE UP (ref 70–99)
GLUCOSE BLDC GLUCOMTR-MCNC: 82 MG/DL — SIGNIFICANT CHANGE UP (ref 70–99)
GLUCOSE SERPL-MCNC: 66 MG/DL — LOW (ref 70–99)
GLUCOSE UR QL: NEGATIVE — SIGNIFICANT CHANGE UP
HCT VFR BLD CALC: 34.7 % — SIGNIFICANT CHANGE UP (ref 34.5–45)
HCT VFR BLD CALC: 36.7 % — SIGNIFICANT CHANGE UP (ref 34.5–45)
HGB BLD-MCNC: 11.3 G/DL — LOW (ref 11.5–15.5)
HGB BLD-MCNC: 11.8 G/DL — SIGNIFICANT CHANGE UP (ref 11.5–15.5)
IMM GRANULOCYTES NFR BLD AUTO: 0.9 % — SIGNIFICANT CHANGE UP (ref 0–0.9)
IMM GRANULOCYTES NFR BLD AUTO: 1.1 % — HIGH (ref 0–0.9)
INR BLD: 0.99 RATIO — SIGNIFICANT CHANGE UP (ref 0.88–1.16)
KETONES UR-MCNC: NEGATIVE — SIGNIFICANT CHANGE UP
LDH SERPL L TO P-CCNC: 157 U/L — SIGNIFICANT CHANGE UP (ref 50–242)
LEUKOCYTE ESTERASE UR-ACNC: NEGATIVE — SIGNIFICANT CHANGE UP
LYMPHOCYTES # BLD AUTO: 1.48 K/UL — SIGNIFICANT CHANGE UP (ref 1–3.3)
LYMPHOCYTES # BLD AUTO: 1.53 K/UL — SIGNIFICANT CHANGE UP (ref 1–3.3)
LYMPHOCYTES # BLD AUTO: 14.8 % — SIGNIFICANT CHANGE UP (ref 13–44)
LYMPHOCYTES # BLD AUTO: 14.9 % — SIGNIFICANT CHANGE UP (ref 13–44)
MCHC RBC-ENTMCNC: 27.3 PG — SIGNIFICANT CHANGE UP (ref 27–34)
MCHC RBC-ENTMCNC: 27.9 PG — SIGNIFICANT CHANGE UP (ref 27–34)
MCHC RBC-ENTMCNC: 32.2 GM/DL — SIGNIFICANT CHANGE UP (ref 32–36)
MCHC RBC-ENTMCNC: 32.6 GM/DL — SIGNIFICANT CHANGE UP (ref 32–36)
MCV RBC AUTO: 85 FL — SIGNIFICANT CHANGE UP (ref 80–100)
MCV RBC AUTO: 85.7 FL — SIGNIFICANT CHANGE UP (ref 80–100)
MONOCYTES # BLD AUTO: 0.47 K/UL — SIGNIFICANT CHANGE UP (ref 0–0.9)
MONOCYTES # BLD AUTO: 0.51 K/UL — SIGNIFICANT CHANGE UP (ref 0–0.9)
MONOCYTES NFR BLD AUTO: 4.6 % — SIGNIFICANT CHANGE UP (ref 2–14)
MONOCYTES NFR BLD AUTO: 5.1 % — SIGNIFICANT CHANGE UP (ref 2–14)
NEUTROPHILS # BLD AUTO: 7.84 K/UL — HIGH (ref 1.8–7.4)
NEUTROPHILS # BLD AUTO: 8.05 K/UL — HIGH (ref 1.8–7.4)
NEUTROPHILS NFR BLD AUTO: 78.2 % — HIGH (ref 43–77)
NEUTROPHILS NFR BLD AUTO: 78.3 % — HIGH (ref 43–77)
NITRITE UR-MCNC: NEGATIVE — SIGNIFICANT CHANGE UP
NRBC # BLD: 0 /100 WBCS — SIGNIFICANT CHANGE UP (ref 0–0)
NRBC # BLD: 0 /100 WBCS — SIGNIFICANT CHANGE UP (ref 0–0)
PH UR: 6 — SIGNIFICANT CHANGE UP (ref 5–8)
PLATELET # BLD AUTO: 212 K/UL — SIGNIFICANT CHANGE UP (ref 150–400)
PLATELET # BLD AUTO: 219 K/UL — SIGNIFICANT CHANGE UP (ref 150–400)
POTASSIUM SERPL-MCNC: 3.5 MMOL/L — SIGNIFICANT CHANGE UP (ref 3.5–5.3)
POTASSIUM SERPL-SCNC: 3.5 MMOL/L — SIGNIFICANT CHANGE UP (ref 3.5–5.3)
PROT ?TM UR-MCNC: 15 MG/DL — HIGH (ref 0–12)
PROT SERPL-MCNC: 6 G/DL — SIGNIFICANT CHANGE UP (ref 6–8.3)
PROT UR-MCNC: SIGNIFICANT CHANGE UP
PROT/CREAT UR-RTO: 0.2 RATIO — SIGNIFICANT CHANGE UP (ref 0–0.2)
PROTHROM AB SERPL-ACNC: 11.5 SEC — SIGNIFICANT CHANGE UP (ref 10.5–13.4)
RBC # BLD: 4.05 M/UL — SIGNIFICANT CHANGE UP (ref 3.8–5.2)
RBC # BLD: 4.32 M/UL — SIGNIFICANT CHANGE UP (ref 3.8–5.2)
RBC # FLD: 13.8 % — SIGNIFICANT CHANGE UP (ref 10.3–14.5)
RBC # FLD: 13.9 % — SIGNIFICANT CHANGE UP (ref 10.3–14.5)
RH IG SCN BLD-IMP: POSITIVE — SIGNIFICANT CHANGE UP
SARS-COV-2 IGG+IGM SERPL QL IA: >250 U/ML — HIGH
SARS-COV-2 IGG+IGM SERPL QL IA: POSITIVE
SODIUM SERPL-SCNC: 140 MMOL/L — SIGNIFICANT CHANGE UP (ref 135–145)
SP GR SPEC: 1.02 — SIGNIFICANT CHANGE UP (ref 1.01–1.02)
T PALLIDUM AB TITR SER: NEGATIVE — SIGNIFICANT CHANGE UP
URATE SERPL-MCNC: 4 MG/DL — SIGNIFICANT CHANGE UP (ref 2.5–7)
UROBILINOGEN FLD QL: NEGATIVE — SIGNIFICANT CHANGE UP
WBC # BLD: 10.01 K/UL — SIGNIFICANT CHANGE UP (ref 3.8–10.5)
WBC # BLD: 10.28 K/UL — SIGNIFICANT CHANGE UP (ref 3.8–10.5)
WBC # FLD AUTO: 10.01 K/UL — SIGNIFICANT CHANGE UP (ref 3.8–10.5)
WBC # FLD AUTO: 10.28 K/UL — SIGNIFICANT CHANGE UP (ref 3.8–10.5)

## 2023-02-09 RX ORDER — OXYCODONE HYDROCHLORIDE 5 MG/1
5 TABLET ORAL ONCE
Refills: 0 | Status: DISCONTINUED | OUTPATIENT
Start: 2023-02-09 | End: 2023-02-11

## 2023-02-09 RX ORDER — DIPHENHYDRAMINE HCL 50 MG
25 CAPSULE ORAL EVERY 6 HOURS
Refills: 0 | Status: DISCONTINUED | OUTPATIENT
Start: 2023-02-09 | End: 2023-02-11

## 2023-02-09 RX ORDER — DIPHENOXYLATE HCL/ATROPINE 2.5-.025MG
2 TABLET ORAL ONCE
Refills: 0 | Status: DISCONTINUED | OUTPATIENT
Start: 2023-02-09 | End: 2023-02-09

## 2023-02-09 RX ORDER — CEFAZOLIN SODIUM 1 G
2000 VIAL (EA) INJECTION EVERY 8 HOURS
Refills: 0 | Status: COMPLETED | OUTPATIENT
Start: 2023-02-10 | End: 2023-02-10

## 2023-02-09 RX ORDER — LANOLIN
1 OINTMENT (GRAM) TOPICAL EVERY 6 HOURS
Refills: 0 | Status: DISCONTINUED | OUTPATIENT
Start: 2023-02-09 | End: 2023-02-11

## 2023-02-09 RX ORDER — AMPICILLIN TRIHYDRATE 250 MG
2 CAPSULE ORAL ONCE
Refills: 0 | Status: COMPLETED | OUTPATIENT
Start: 2023-02-09 | End: 2023-02-09

## 2023-02-09 RX ORDER — AMPICILLIN TRIHYDRATE 250 MG
CAPSULE ORAL
Refills: 0 | Status: DISCONTINUED | OUTPATIENT
Start: 2023-02-09 | End: 2023-02-11

## 2023-02-09 RX ORDER — TETANUS TOXOID, REDUCED DIPHTHERIA TOXOID AND ACELLULAR PERTUSSIS VACCINE, ADSORBED 5; 2.5; 8; 8; 2.5 [IU]/.5ML; [IU]/.5ML; UG/.5ML; UG/.5ML; UG/.5ML
0.5 SUSPENSION INTRAMUSCULAR ONCE
Refills: 0 | Status: DISCONTINUED | OUTPATIENT
Start: 2023-02-09 | End: 2023-02-11

## 2023-02-09 RX ORDER — SIMETHICONE 80 MG/1
80 TABLET, CHEWABLE ORAL EVERY 4 HOURS
Refills: 0 | Status: DISCONTINUED | OUTPATIENT
Start: 2023-02-09 | End: 2023-02-11

## 2023-02-09 RX ORDER — OXYTOCIN 10 UNIT/ML
333.33 VIAL (ML) INJECTION
Qty: 20 | Refills: 0 | Status: DISCONTINUED | OUTPATIENT
Start: 2023-02-09 | End: 2023-02-11

## 2023-02-09 RX ORDER — SODIUM CHLORIDE 9 MG/ML
3 INJECTION INTRAMUSCULAR; INTRAVENOUS; SUBCUTANEOUS EVERY 8 HOURS
Refills: 0 | Status: DISCONTINUED | OUTPATIENT
Start: 2023-02-09 | End: 2023-02-11

## 2023-02-09 RX ORDER — DIBUCAINE 1 %
1 OINTMENT (GRAM) RECTAL EVERY 6 HOURS
Refills: 0 | Status: DISCONTINUED | OUTPATIENT
Start: 2023-02-09 | End: 2023-02-11

## 2023-02-09 RX ORDER — HYDROCORTISONE 1 %
1 OINTMENT (GRAM) TOPICAL EVERY 6 HOURS
Refills: 0 | Status: DISCONTINUED | OUTPATIENT
Start: 2023-02-09 | End: 2023-02-11

## 2023-02-09 RX ORDER — DIPHENHYDRAMINE HCL 50 MG
25 CAPSULE ORAL ONCE
Refills: 0 | Status: COMPLETED | OUTPATIENT
Start: 2023-02-09 | End: 2023-02-09

## 2023-02-09 RX ORDER — CARBOPROST TROMETHAMINE 250 UG/ML
250 INJECTION, SOLUTION INTRAMUSCULAR ONCE
Refills: 0 | Status: COMPLETED | OUTPATIENT
Start: 2023-02-09 | End: 2023-02-09

## 2023-02-09 RX ORDER — SODIUM CHLORIDE 9 MG/ML
1000 INJECTION INTRAMUSCULAR; INTRAVENOUS; SUBCUTANEOUS
Refills: 0 | Status: DISCONTINUED | OUTPATIENT
Start: 2023-02-09 | End: 2023-02-10

## 2023-02-09 RX ORDER — OXYTOCIN 10 UNIT/ML
41.67 VIAL (ML) INJECTION
Qty: 20 | Refills: 0 | Status: DISCONTINUED | OUTPATIENT
Start: 2023-02-09 | End: 2023-02-11

## 2023-02-09 RX ORDER — ACETAMINOPHEN 500 MG
975 TABLET ORAL
Refills: 0 | Status: DISCONTINUED | OUTPATIENT
Start: 2023-02-09 | End: 2023-02-11

## 2023-02-09 RX ORDER — OXYCODONE HYDROCHLORIDE 5 MG/1
5 TABLET ORAL
Refills: 0 | Status: DISCONTINUED | OUTPATIENT
Start: 2023-02-09 | End: 2023-02-11

## 2023-02-09 RX ORDER — CITRIC ACID/SODIUM CITRATE 300-500 MG
15 SOLUTION, ORAL ORAL EVERY 6 HOURS
Refills: 0 | Status: DISCONTINUED | OUTPATIENT
Start: 2023-02-09 | End: 2023-02-10

## 2023-02-09 RX ORDER — OXYTOCIN 10 UNIT/ML
10 VIAL (ML) INJECTION ONCE
Refills: 0 | Status: COMPLETED | OUTPATIENT
Start: 2023-02-09 | End: 2023-02-09

## 2023-02-09 RX ORDER — SODIUM CHLORIDE 9 MG/ML
500 INJECTION, SOLUTION INTRAVENOUS ONCE
Refills: 0 | Status: COMPLETED | OUTPATIENT
Start: 2023-02-09 | End: 2023-02-09

## 2023-02-09 RX ORDER — OXYTOCIN 10 UNIT/ML
4 VIAL (ML) INJECTION
Qty: 30 | Refills: 0 | Status: DISCONTINUED | OUTPATIENT
Start: 2023-02-09 | End: 2023-02-10

## 2023-02-09 RX ORDER — BENZOCAINE 10 %
1 GEL (GRAM) MUCOUS MEMBRANE EVERY 6 HOURS
Refills: 0 | Status: DISCONTINUED | OUTPATIENT
Start: 2023-02-09 | End: 2023-02-11

## 2023-02-09 RX ORDER — AER TRAVELER 0.5 G/1
1 SOLUTION RECTAL; TOPICAL EVERY 4 HOURS
Refills: 0 | Status: DISCONTINUED | OUTPATIENT
Start: 2023-02-09 | End: 2023-02-11

## 2023-02-09 RX ORDER — CEFAZOLIN SODIUM 1 G
VIAL (EA) INJECTION
Refills: 0 | Status: DISCONTINUED | OUTPATIENT
Start: 2023-02-09 | End: 2023-02-09

## 2023-02-09 RX ORDER — AMPICILLIN TRIHYDRATE 250 MG
1 CAPSULE ORAL EVERY 4 HOURS
Refills: 0 | Status: DISCONTINUED | OUTPATIENT
Start: 2023-02-09 | End: 2023-02-11

## 2023-02-09 RX ORDER — KETOROLAC TROMETHAMINE 30 MG/ML
30 SYRINGE (ML) INJECTION ONCE
Refills: 0 | Status: DISCONTINUED | OUTPATIENT
Start: 2023-02-09 | End: 2023-02-09

## 2023-02-09 RX ORDER — CHLORHEXIDINE GLUCONATE 213 G/1000ML
1 SOLUTION TOPICAL ONCE
Refills: 0 | Status: DISCONTINUED | OUTPATIENT
Start: 2023-02-09 | End: 2023-02-10

## 2023-02-09 RX ORDER — MAGNESIUM HYDROXIDE 400 MG/1
30 TABLET, CHEWABLE ORAL
Refills: 0 | Status: DISCONTINUED | OUTPATIENT
Start: 2023-02-09 | End: 2023-02-11

## 2023-02-09 RX ORDER — CEFAZOLIN SODIUM 1 G
VIAL (EA) INJECTION
Refills: 0 | Status: COMPLETED | OUTPATIENT
Start: 2023-02-09 | End: 2023-02-11

## 2023-02-09 RX ORDER — PRAMOXINE HYDROCHLORIDE 150 MG/15G
1 AEROSOL, FOAM RECTAL EVERY 4 HOURS
Refills: 0 | Status: DISCONTINUED | OUTPATIENT
Start: 2023-02-09 | End: 2023-02-11

## 2023-02-09 RX ORDER — SODIUM CHLORIDE 9 MG/ML
1000 INJECTION, SOLUTION INTRAVENOUS
Refills: 0 | Status: DISCONTINUED | OUTPATIENT
Start: 2023-02-09 | End: 2023-02-10

## 2023-02-09 RX ORDER — IBUPROFEN 200 MG
600 TABLET ORAL EVERY 6 HOURS
Refills: 0 | Status: COMPLETED | OUTPATIENT
Start: 2023-02-09 | End: 2024-01-08

## 2023-02-09 RX ORDER — CEFAZOLIN SODIUM 1 G
2000 VIAL (EA) INJECTION ONCE
Refills: 0 | Status: COMPLETED | OUTPATIENT
Start: 2023-02-09 | End: 2023-02-09

## 2023-02-09 RX ADMIN — Medication 10 UNIT(S): at 21:19

## 2023-02-09 RX ADMIN — Medication 100 MILLIGRAM(S): at 23:05

## 2023-02-09 RX ADMIN — Medication 208 GRAM(S): at 11:54

## 2023-02-09 RX ADMIN — Medication 25 MILLIGRAM(S): at 15:24

## 2023-02-09 RX ADMIN — Medication 4 MILLIUNIT(S)/MIN: at 07:36

## 2023-02-09 RX ADMIN — Medication 2 TABLET(S): at 21:32

## 2023-02-09 RX ADMIN — Medication 208 GRAM(S): at 16:09

## 2023-02-09 RX ADMIN — Medication 125 MILLIUNIT(S)/MIN: at 21:14

## 2023-02-09 RX ADMIN — Medication 208 GRAM(S): at 20:23

## 2023-02-09 RX ADMIN — SODIUM CHLORIDE 3 MILLILITER(S): 9 INJECTION INTRAMUSCULAR; INTRAVENOUS; SUBCUTANEOUS at 21:14

## 2023-02-09 RX ADMIN — SODIUM CHLORIDE 1000 MILLILITER(S): 9 INJECTION, SOLUTION INTRAVENOUS at 22:45

## 2023-02-09 RX ADMIN — CARBOPROST TROMETHAMINE 250 MICROGRAM(S): 250 INJECTION, SOLUTION INTRAMUSCULAR at 21:18

## 2023-02-09 RX ADMIN — Medication 200 GRAM(S): at 07:52

## 2023-02-09 NOTE — OB PROVIDER H&P - NSLOWPPHRISK_OBGYN_A_OB
No previous uterine incision/Meyer Pregnancy/Less than or equal to 4 previous vaginal births/No known bleeding disorder/No history of postpartum hemorrhage/No other PPH risks indicated

## 2023-02-09 NOTE — OB PROVIDER H&P - NSHPSOCIALHISTORY_GEN_ALL_CORE
Pt is  an lives at home with her  and two children. Pt works and is sexually active. Denies alcohol, cigarette smoking and drug use.

## 2023-02-09 NOTE — OB PROVIDER DELIVERY SUMMARY - NSPROVIDERDELIVERYNOTE_OBGYN_ALL_OB_FT
Full term live male child delivered over intact perineum in TARI position.  Loose nuchal cord x1 easily reduced. Mouth and nose bulb suctioned at perineum after anterior shoulder delivered. Posterior shoulder and body delivered with ease.  Spontaneous cry noted. Cord doubly clamped and cut after 30 second delay. Baby to mother for skin to skin. Placenta delivered intact. Uterus massaged until firm and cleared of all clots and debris.  Uterus noted to be boggy and was massaged down.  Membranes noted in the uterus and manually removed. Rectal Cytotec, IM Pitocin, and Hemabate IM given to help contract down the uterus. Cervix and vagina intact.   Midline second-degree perineal tear repaired with 2-0 vicryl in usual fashion.  terus was well contracted down after repair. Rectal exam negative. Excellent hemostasis noted. Apgars 9/9. Mother and baby to RR in stable condition.

## 2023-02-09 NOTE — OB PROVIDER H&P - ATTENDING COMMENTS
Pt seen and examined.  Agree with note above  For induction due to GDMA2.  R / B / A disc.  Mariajose Parekh MD

## 2023-02-09 NOTE — OB RN DELIVERY SUMMARY - NS_SEPSISRSKCALC_OBGYN_ALL_OB_FT
EOS calculated successfully. EOS Risk Factor: 0.5/1000 live births ([facility] national incidence); GA=39w;Temp=99.86; ROM=2.7; GBS='Positive'; Antibiotics='GBS specific antibiotics > 2 hrs prior to birth'

## 2023-02-09 NOTE — OB PROVIDER H&P - PROBLEM SELECTOR PLAN 1
- Admit  - IV access  - Fluid   - Fingersticks  - Ampicillin  - Pitocin induction  - Baseline HELLP labs  - - Admit  - IV access  - Fluid   - Fingersticks  - Ampicillin  - Pitocin induction  - Baseline HELLP labs  -expectant .    SAÚL Chambers

## 2023-02-09 NOTE — OB PROVIDER H&P - HISTORY OF PRESENT ILLNESS
38 yo  F 39 weeks pregnant PMHx postpartum preeclampsia, D+C(2021) current Gestational DM(Humilin) and HTN presents to L+D for induction. Pt reports fetal movement and minor irregular contractions since last night. Denies leakage of fluid and bleeding. Pt is Group B strep + and last U/S one week ago showed EFW: 6.3 oz. Pt fasting AM fingerstick from  was 67 and 2/8 pm fingers stick 206(after dinner), with no measure as of this AM. Pt had two instances of elevated BP during pregnancy and is currently taking baby aspirin daily. Prior pregnancy from 2018 the had fetal weight of 7.11 oz. Denies fever, chills, vision changes, headaches, dizziness, congestion, cough, wheezing, CP, palpitations edema, N/V/D/C, vaginal discharge, urinary changes, dysuria,     all: Denies   meds: Baby Aspirin 1 x daily, Humilin 46 units, prenatals    pmhx: Denies  ob: postpartum preeclampsia,  x2 (, ), Miscarriage x 1, Current gestational DM and HTN  gyn: denies  surg: D+C, 2021, no complications   fhx: Mother and Father both with T2DM, HTN  soc: Denies cigarette smoking, drug use or alcohol use.  36 yo  F 39 weeks pregnant PMHx postpartum preeclampsia, D+C(2021) current Gestational DM(Humilin) and HTN presents to L+D for induction. Pt reports fetal movement and minor irregular contractions since last night. Denies leakage of fluid and bleeding. Pt is Group B strep + and last U/S one week ago showed EFW: 6.3 oz. Pt fasting AM fingerstick from  was 67 and 2/8 pm fingers stick 206(after dinner), with no measure as of this AM. Pt had two instances of elevated BP during pregnancy and is currently taking baby aspirin daily. Prior pregnancy from 2018 the had fetal weight of 7.11 oz. Denies fever, chills, vision changes, headaches, dizziness, congestion, cough, wheezing, CP, palpitations edema, N/V/D/C, vaginal discharge, urinary changes, dysuria,     all: Denies   meds: Baby Aspirin 1 x daily, Humilin 46 units at night, admelog 4-6 units with meals, prenatals    pmhx: Denies  ob: postpartum preeclampsia,  x2 (, ) with largest baby [id number], Miscarriage x 1, Current gestational DM and HTN  gyn: denies  surg: D+C, 2021, no complications   fhx: Mother and Father both with T2DM, HTN  soc: Denies cigarette smoking, drug use or alcohol use.

## 2023-02-09 NOTE — OB PROVIDER H&P - NSHPPHYSICALEXAM_GEN_ALL_CORE
AxO x 3  Normal S1 S2, RRR, no peripheral edema   No wheezing, or respiratory distress   Abdomen gravid, soft, non tender AxO x 3  Normal S1 S2, RRR, no peripheral edema   No wheezing, or respiratory distress   Abdomen gravid, soft, non tender    VE: 2/60/-3 posterior, soft  sono: vertex

## 2023-02-09 NOTE — OB PROVIDER DELIVERY SUMMARY - NS_BEFORE39WEEKS_OBGYN_ALL_OB
No
Adrenal insufficiency on hydrocortisone  Follow up BMP, if high, give insulin or Kayexylate  Consulted endocrinology- Dr. Perlman

## 2023-02-09 NOTE — OB RN DELIVERY SUMMARY - NS_LABORCHARACTER_OBGYN_ALL_OB
Induction of labor-AROM/Induction of labor-Medicinal/Other - excessive bleeding/External electronic FM/Antibiotics in labor

## 2023-02-09 NOTE — OB PROVIDER LABOR PROGRESS NOTE - NS_SUBJECTIVE/OBJECTIVE_OBGYN_ALL_OB_FT
Patient  comfortable with contractions
R1 Labor & Delivery Progress Note     Pt seen & examined at bedside for repeat VE due to increased pressure.    T(C): 37.6 (02-09-23 @ 17:39), Max: 37.7 (02-09-23 @ 15:26)  HR: 106 (02-09-23 @ 19:53) (93 - 118)  BP: 109/82 (02-09-23 @ 19:53) (91/65 - 143/56)  RR: 18 (02-09-23 @ 16:42) (18 - 18)  SpO2: 98% (02-09-23 @ 19:51) (90% - 99%)
Pt comf with contractions  37.6 (from 37.8)

## 2023-02-09 NOTE — OB PROVIDER H&P - NSICDXPASTSURGICALHX_GEN_ALL_CORE_FT
PAST SURGICAL HISTORY:  H/O dilation and curettage     NVD (normal vaginal delivery) 2015, 2018: epidural

## 2023-02-09 NOTE — OB NEONATOLOGY/PEDIATRICIAN DELIVERY SUMMARY - NSPEDSNEONOTESA_OBGYN_ALL_OB_FT
Peds NP called to delivery for CAT II tracing.     Baby boy, AGA, born on  (21:09) at 39.0 wks via  to a 36 y/o , O+ blood type mother. Maternal history of GDMA2, HTN, MIS w/ D+C. No significant prenatal history. PNL nr/immune/-, GBS + on , received amp x5, COVID pending. AROM at 18:27 (~3 HRS) with clear fluids. Baby emerged vigorous, crying, was w/d/s/s with APGARS of 9/9, nuchal x1, void x1. Mom would like to breastfeed, consents Hep B, and undecided on circ. Tmax: 37.3C. EOS: 0.09.    Physical Exam:  Gen: NAD, +grimace  HEENT: anterior fontanel open soft and flat, no cleft lip/palate, ears normal set, no ear pits or tags. no lesions in mouth/throat, nares clinically patent  Resp: no increased work of breathing, good air entry b/l, clear to auscultation bilaterally  Cardio: Normal S1/S2, regular rate and rhythm, no murmurs, rubs or gallops  Abd: soft, non tender, non distended, + bowel sounds, umbilical cord with 3 vessels  Neuro: +grasp/suck/diana, normal tone  Extremities: negative pérez and ortolani, moving all extremities, full range of motion x 4, no crepitus  Skin: pink, warm  Genitals: Normal male anatomy, testicles palpable in scrotum b/l, Max 1, anus patent

## 2023-02-09 NOTE — OB RN PATIENT PROFILE - WEIGHT: PREPREGNANCY IN LBS
Quality 265: Biopsy Follow-Up: Biopsy results reviewed, communicated, tracked, and documented
Detail Level: Detailed
170

## 2023-02-09 NOTE — OB RN PATIENT PROFILE - NS_OBGYNHISTORY_OBGYN_ALL_OB_FT
DINORAH- Darrel 46u QHS; Nikolas ROLDANN   x2 (, 2018); post-partum PEC x1  Misc x1 w/ D&C  Dengue-hemorrhagic fever ()---> blood transfusion

## 2023-02-09 NOTE — OB PROVIDER LABOR PROGRESS NOTE - ASSESSMENT
Term pregnancy  induction for gdma2  will arom and monitor.    PLEE
term pregnancy  AROM performed with ISE needle.  Head floated down and well applied  Will continue to monitor closely    PLEE
Plan: 37y y/o  @39w in stable condition  - Forebag noted, did not AROM at this time  - Con't IOL with Pitocin  - Cleared for epidural topoff  - Continuous EFM, [place]  - Con't IVF    Attending physician, Dr. Parekh, in house  Sophia Kaminski PGY1

## 2023-02-09 NOTE — OB RN DELIVERY SUMMARY - NSSELHIDDEN_OBGYN_ALL_OB_FT
[NS_DeliveryAttending1_OBGYN_ALL_OB_FT:[id number]==],[NS_DeliveryRN_OBGYN_ALL_OB_FT:[id number]=]

## 2023-02-10 LAB
APTT BLD: 26.4 SEC — LOW (ref 27.5–35.5)
FIBRINOGEN PPP-MCNC: 451 MG/DL — HIGH (ref 200–445)
HCT VFR BLD CALC: 36.4 % — SIGNIFICANT CHANGE UP (ref 34.5–45)
HGB BLD-MCNC: 11.6 G/DL — SIGNIFICANT CHANGE UP (ref 11.5–15.5)
INR BLD: 0.98 RATIO — SIGNIFICANT CHANGE UP (ref 0.88–1.16)
MCHC RBC-ENTMCNC: 27.7 PG — SIGNIFICANT CHANGE UP (ref 27–34)
MCHC RBC-ENTMCNC: 31.9 GM/DL — LOW (ref 32–36)
MCV RBC AUTO: 86.9 FL — SIGNIFICANT CHANGE UP (ref 80–100)
NRBC # BLD: 0 /100 WBCS — SIGNIFICANT CHANGE UP (ref 0–0)
PLATELET # BLD AUTO: 232 K/UL — SIGNIFICANT CHANGE UP (ref 150–400)
PROT ?TM UR-MCNC: 19 MG/DL — HIGH (ref 0–12)
PROTHROM AB SERPL-ACNC: 11.4 SEC — SIGNIFICANT CHANGE UP (ref 10.5–13.4)
RBC # BLD: 4.19 M/UL — SIGNIFICANT CHANGE UP (ref 3.8–5.2)
RBC # FLD: 14 % — SIGNIFICANT CHANGE UP (ref 10.3–14.5)
WBC # BLD: 21.55 K/UL — HIGH (ref 3.8–10.5)
WBC # FLD AUTO: 21.55 K/UL — HIGH (ref 3.8–10.5)

## 2023-02-10 RX ORDER — IBUPROFEN 200 MG
600 TABLET ORAL EVERY 6 HOURS
Refills: 0 | Status: DISCONTINUED | OUTPATIENT
Start: 2023-02-10 | End: 2023-02-11

## 2023-02-10 RX ADMIN — Medication 975 MILLIGRAM(S): at 14:23

## 2023-02-10 RX ADMIN — Medication 600 MILLIGRAM(S): at 05:51

## 2023-02-10 RX ADMIN — SODIUM CHLORIDE 3 MILLILITER(S): 9 INJECTION INTRAMUSCULAR; INTRAVENOUS; SUBCUTANEOUS at 06:58

## 2023-02-10 RX ADMIN — Medication 600 MILLIGRAM(S): at 17:40

## 2023-02-10 RX ADMIN — Medication 975 MILLIGRAM(S): at 09:59

## 2023-02-10 RX ADMIN — Medication 600 MILLIGRAM(S): at 12:32

## 2023-02-10 RX ADMIN — Medication 975 MILLIGRAM(S): at 15:20

## 2023-02-10 RX ADMIN — Medication 975 MILLIGRAM(S): at 21:01

## 2023-02-10 RX ADMIN — Medication 100 MILLIGRAM(S): at 21:13

## 2023-02-10 RX ADMIN — Medication 975 MILLIGRAM(S): at 09:07

## 2023-02-10 RX ADMIN — Medication 600 MILLIGRAM(S): at 13:30

## 2023-02-10 RX ADMIN — Medication 975 MILLIGRAM(S): at 20:21

## 2023-02-10 RX ADMIN — SODIUM CHLORIDE 3 MILLILITER(S): 9 INJECTION INTRAMUSCULAR; INTRAVENOUS; SUBCUTANEOUS at 14:00

## 2023-02-10 RX ADMIN — Medication 600 MILLIGRAM(S): at 18:23

## 2023-02-10 RX ADMIN — Medication 100 MILLIGRAM(S): at 14:42

## 2023-02-10 RX ADMIN — Medication 975 MILLIGRAM(S): at 03:06

## 2023-02-10 RX ADMIN — Medication 100 MILLIGRAM(S): at 06:47

## 2023-02-10 RX ADMIN — Medication 975 MILLIGRAM(S): at 02:06

## 2023-02-10 RX ADMIN — Medication 1 TABLET(S): at 12:32

## 2023-02-10 NOTE — PROGRESS NOTE ADULT - SUBJECTIVE AND OBJECTIVE BOX
Postpartum Note- PPD#1    Allergies    No Known Allergies    Intolerances    Prenatal labs:  Rubella IgG:  Immune       RPR:   Negative       Blood Type: O+    S:Patient is a  37y   G 4   P3      PPD#1         S/P      Patient w/o complaints, pain is controlled.    Pt is OOB, tolerating PO, passing flatus. Lochia WNL.   Feeding:    O:  Vital Signs Last 24 Hrs  T(C): 36.8 (10 Feb 2023 05:46), Max: 37.7 (2023 15:26)  T(F): 98.2 (10 Feb 2023 05:46), Max: 99.86 (2023 15:26)  HR: 93 (10 Feb 2023 05:46) (93 - 213)  BP: 109/69 (10 Feb 2023 05:46) (91/65 - 143/56)  BP(mean): 102 (10 Feb 2023 00:30) (92 - 102)  RR: 18 (10 Feb 2023 05:46) (16 - 18)  SpO2: 96% (10 Feb 2023 05:46) (90% - 100%)    Parameters below as of 10 Feb 2023 05:46  Patient On (Oxygen Delivery Method): room air         Gen: NAD  CV: rrr s1s2, CTABL  Abdomen: Soft, nontender, non-distended, fundus firm.  Lochia: WNL  Perineum: second degree laceration  Ext: Neg edema, Neg calf tenderness.  Pedal pulses palpated B/L    LABS:    Hemoglobin: 11.6 g/dL (02-10 @ 01:44)  Hemoglobin: 11.3 g/dL ( @ 13:11)  Hemoglobin: 11.8 g/dL ( @ 07:25)      Hematocrit: 36.4 % (02-10 @ 01:44)  Hematocrit: 34.7 % ( @ 13:11)  Hematocrit: 36.7 % ( @ 07:25)      A/P:  37y  PPD # 1      S/P   ,   doing well    PMHx: ob: postpartum preeclampsia,  x2 (, 2018) with largest baby [id number], Miscarriage x 1,  Current Issues: none    Increase OOB  Regular diet  PO Pain protocol  AM H&H  Routine Postpartum Care         Postpartum Note- PPD#1    Allergies    No Known Allergies    Intolerances    Prenatal labs:  Rubella IgG:  Immune       RPR:   Negative       Blood Type: O+    S:Patient is a  37y   G 4   P3      PPD#1         S/P      Patient w/o complaints, pain is controlled.    Pt is OOB, tolerating PO, passing flatus. Lochia WNL.   Feeding:    O:  Vital Signs Last 24 Hrs  T(C): 36.8 (10 Feb 2023 05:46), Max: 37.7 (2023 15:26)  T(F): 98.2 (10 Feb 2023 05:46), Max: 99.86 (2023 15:26)  HR: 93 (10 Feb 2023 05:46) (93 - 213)  BP: 109/69 (10 Feb 2023 05:46) (91/65 - 143/56)  BP(mean): 102 (10 Feb 2023 00:30) (92 - 102)  RR: 18 (10 Feb 2023 05:46) (16 - 18)  SpO2: 96% (10 Feb 2023 05:46) (90% - 100%)    Parameters below as of 10 Feb 2023 05:46  Patient On (Oxygen Delivery Method): room air         Gen: NAD  CV: rrr s1s2, CTABL  Abdomen: Soft, nontender, non-distended, fundus firm.  Lochia: WNL  Perineum: second degree laceration  Ext: Neg edema, Neg calf tenderness.  Pedal pulses palpated B/L    LABS:    Hemoglobin: 11.6 g/dL (02-10 @ 01:44)  Hemoglobin: 11.3 g/dL ( @ 13:11)  Hemoglobin: 11.8 g/dL ( @ 07:25)      Hematocrit: 36.4 % (02-10 @ 01:44)  Hematocrit: 34.7 % ( @ 13:11)  Hematocrit: 36.7 % ( @ 07:25)      A/P:  37y  PPD # 1      S/P   ,   doing well    PMHx: ob: postpartum preeclampsia,  x2 (, ) with largest baby [id number], Miscarriage x 1,  Current Issues: gHTN, HELLP labs WNL, P/C. 2, VSS  Ancef x 24 hrs, WBC 21 on 2/10    Increase OOB  Regular diet  PO Pain protocol  AM H&H  Routine Postpartum Care

## 2023-02-10 NOTE — PROGRESS NOTE ADULT - ATTENDING COMMENTS
Pt seen and examined  Agree with PA note  Routine care  states that bleeding is not significant.  fundus firm.      PLEE

## 2023-02-11 ENCOUNTER — TRANSCRIPTION ENCOUNTER (OUTPATIENT)
Age: 38
End: 2023-02-11

## 2023-02-11 VITALS
RESPIRATION RATE: 18 BRPM | OXYGEN SATURATION: 97 % | TEMPERATURE: 98 F | DIASTOLIC BLOOD PRESSURE: 88 MMHG | HEART RATE: 91 BPM | SYSTOLIC BLOOD PRESSURE: 127 MMHG

## 2023-02-11 PROCEDURE — 84156 ASSAY OF PROTEIN URINE: CPT

## 2023-02-11 PROCEDURE — 85027 COMPLETE CBC AUTOMATED: CPT

## 2023-02-11 PROCEDURE — 86850 RBC ANTIBODY SCREEN: CPT

## 2023-02-11 PROCEDURE — 85730 THROMBOPLASTIN TIME PARTIAL: CPT

## 2023-02-11 PROCEDURE — 85610 PROTHROMBIN TIME: CPT

## 2023-02-11 PROCEDURE — 86769 SARS-COV-2 COVID-19 ANTIBODY: CPT

## 2023-02-11 PROCEDURE — 81003 URINALYSIS AUTO W/O SCOPE: CPT

## 2023-02-11 PROCEDURE — 85025 COMPLETE CBC W/AUTO DIFF WBC: CPT

## 2023-02-11 PROCEDURE — 80053 COMPREHEN METABOLIC PANEL: CPT

## 2023-02-11 PROCEDURE — 85384 FIBRINOGEN ACTIVITY: CPT

## 2023-02-11 PROCEDURE — 82962 GLUCOSE BLOOD TEST: CPT

## 2023-02-11 PROCEDURE — 86900 BLOOD TYPING SEROLOGIC ABO: CPT

## 2023-02-11 PROCEDURE — 86901 BLOOD TYPING SEROLOGIC RH(D): CPT

## 2023-02-11 PROCEDURE — 82570 ASSAY OF URINE CREATININE: CPT

## 2023-02-11 PROCEDURE — 83615 LACTATE (LD) (LDH) ENZYME: CPT

## 2023-02-11 PROCEDURE — 84550 ASSAY OF BLOOD/URIC ACID: CPT

## 2023-02-11 PROCEDURE — 86780 TREPONEMA PALLIDUM: CPT

## 2023-02-11 RX ORDER — ACETAMINOPHEN 500 MG
3 TABLET ORAL
Qty: 0 | Refills: 0 | DISCHARGE
Start: 2023-02-11

## 2023-02-11 RX ADMIN — Medication 600 MILLIGRAM(S): at 06:12

## 2023-02-11 RX ADMIN — Medication 975 MILLIGRAM(S): at 02:10

## 2023-02-11 RX ADMIN — Medication 600 MILLIGRAM(S): at 06:57

## 2023-02-11 RX ADMIN — Medication 1 TABLET(S): at 11:55

## 2023-02-11 RX ADMIN — Medication 975 MILLIGRAM(S): at 03:11

## 2023-02-11 RX ADMIN — Medication 600 MILLIGRAM(S): at 11:55

## 2023-02-11 RX ADMIN — Medication 975 MILLIGRAM(S): at 08:36

## 2023-02-11 NOTE — DISCHARGE NOTE OB - CLICK TO LAUNCH ORM
Initiate Treatment: Triamcinolone 0.1% cream BID for up to 2 weeks, rest 1 week, repeat PRN flares Continue Regimen: Eucrisa 2% oint BID for maintenance Detail Level: Zone Discontinue Regimen: fluocinonide cream .

## 2023-02-11 NOTE — DISCHARGE NOTE OB - CARE PROVIDER_API CALL
Mariajose Parekh)  Obstetrics and Gynecology  [address] [place] [address], [place] [address]  Phone: [phone]  Fax: [phone]  Follow Up Time:

## 2023-02-11 NOTE — PROGRESS NOTE ADULT - ASSESSMENT
37y  PPD # 1      S/P   ,   doing well
A: 37y PPD#2 s/p  doing well.    Plan:   discharge home today.    Continue to monitor her blood pressures.    When to call MD discussed.    Symptoms and signs of preeclampsia reviewed with the patient.        Mariajose Parekh MD

## 2023-02-11 NOTE — DISCHARGE NOTE OB - PATIENT PORTAL LINK FT
You can access the FollowMyHealth Patient Portal offered by [facility] by registering at the following website: [email or web address]. By joining [employer]’s FollowMyHealth portal, you will also be able to view your health information using other applications (apps) compatible with our system.

## 2023-02-11 NOTE — DISCHARGE NOTE OB - ADDITIONAL INSTRUCTIONS
please call for in-person appointment in 4-6 weeks.  take your blood pressures (BP) at least 2 times a day,  call the doctor if your BP is over 140/90, or if you are experiencing headache, blurry vision, visual changes, abdominal pain.

## 2023-02-11 NOTE — PROGRESS NOTE ADULT - SUBJECTIVE AND OBJECTIVE BOX
S: Patient doing well. Minimal lochia. Pain controlled. denies any headaches or blurry vision    PAST MEDICAL & SURGICAL HISTORY:  Preeclampsia      DM (diabetes mellitus), gestational  2015 preg      NVD (normal vaginal delivery)  2015, 2018: epidural      H/O dilation and curettage        O: Vital Signs Last 24 Hrs  T(C): 36.5 (11 Feb 2023 09:00), Max: 36.9 (10 Feb 2023 18:00)  T(F): 97.7 (11 Feb 2023 09:00), Max: 98.4 (10 Feb 2023 18:00)  HR: 91 (11 Feb 2023 09:00) (83 - 99)  BP: 127/88 (11 Feb 2023 09:00) (108/72 - 127/88)  BP(mean): --  RR: 18 (11 Feb 2023 09:00) (17 - 18)  SpO2: 97% (11 Feb 2023 09:00) (97% - 97%)    Parameters below as of 11 Feb 2023 09:00  Patient On (Oxygen Delivery Method): room air        Gen: NAD  Abd: soft, NT, ND, fundus firm below umbilicus  Lochia: moderate  Ext: no tenderness    Labs:                        11.6   21.55 )-----------( 232      ( 10 Feb 2023 01:44 )             36.4

## 2023-02-11 NOTE — DISCHARGE NOTE OB - MATERIALS PROVIDED
[facility] [place] Screening Program/[place]  Immunization Record/Breastfeeding Mother’s Support Group Information/Guide to Postpartum Care/Breastfeeding Guide and Packet/Discharge Medication Information for Patients and Families Pocket Guide

## 2023-02-11 NOTE — DISCHARGE NOTE OB - MEDICATION SUMMARY - MEDICATIONS TO TAKE
I will START or STAY ON the medications listed below when I get home from the hospital:    ibuprofen 600 mg oral tablet  -- 1 tab(s) by mouth once, As needed, Moderate Pain (4 - 6)  -- Indication: For   For pain and fever    acetaminophen 325 mg oral tablet  -- 3 tab(s) by mouth every 6 hours  -- Indication: For  for pain and fever    Prenatal Multivitamins with Folic Acid 1 mg oral tablet  -- 1 tab(s) by mouth once a day  -- Indication: For  for nutrition

## 2023-02-11 NOTE — DISCHARGE NOTE OB - HOSPITAL COURSE
Pt underwent an  without any complications. Her postpartum course was uneventful. She met all her milestones in regards to her vitals, postpartum labs, diet, ambulation, pain management. Follow up discussed. Pt was discharged home on PPD#2.

## 2023-02-14 ENCOUNTER — EMERGENCY (EMERGENCY)
Facility: HOSPITAL | Age: 38
LOS: 1 days | Discharge: HOME | End: 2023-02-14
Attending: STUDENT IN AN ORGANIZED HEALTH CARE EDUCATION/TRAINING PROGRAM
Payer: COMMERCIAL

## 2023-02-14 VITALS
TEMPERATURE: 98 F | RESPIRATION RATE: 16 BRPM | HEART RATE: 90 BPM | WEIGHT: 182.1 LBS | OXYGEN SATURATION: 97 % | HEIGHT: 62 IN | SYSTOLIC BLOOD PRESSURE: 149 MMHG | DIASTOLIC BLOOD PRESSURE: 91 MMHG

## 2023-02-14 VITALS
OXYGEN SATURATION: 100 % | DIASTOLIC BLOOD PRESSURE: 84 MMHG | SYSTOLIC BLOOD PRESSURE: 118 MMHG | HEART RATE: 95 BPM | TEMPERATURE: 98 F | RESPIRATION RATE: 20 BRPM

## 2023-02-14 DIAGNOSIS — Z98.890 OTHER SPECIFIED POSTPROCEDURAL STATES: Chronic | ICD-10-CM

## 2023-02-14 LAB
ALBUMIN SERPL ELPH-MCNC: 3.5 G/DL — SIGNIFICANT CHANGE UP (ref 3.3–5)
ALP SERPL-CCNC: 108 U/L — SIGNIFICANT CHANGE UP (ref 40–120)
ALT FLD-CCNC: 34 U/L — SIGNIFICANT CHANGE UP (ref 10–45)
ANION GAP SERPL CALC-SCNC: 11 MMOL/L — SIGNIFICANT CHANGE UP (ref 5–17)
APPEARANCE UR: CLEAR — SIGNIFICANT CHANGE UP
AST SERPL-CCNC: 31 U/L — SIGNIFICANT CHANGE UP (ref 10–40)
BACTERIA # UR AUTO: NEGATIVE — SIGNIFICANT CHANGE UP
BASOPHILS # BLD AUTO: 0.04 K/UL — SIGNIFICANT CHANGE UP (ref 0–0.2)
BASOPHILS NFR BLD AUTO: 0.5 % — SIGNIFICANT CHANGE UP (ref 0–2)
BILIRUB SERPL-MCNC: 0.2 MG/DL — SIGNIFICANT CHANGE UP (ref 0.2–1.2)
BILIRUB UR-MCNC: NEGATIVE — SIGNIFICANT CHANGE UP
BUN SERPL-MCNC: 15 MG/DL — SIGNIFICANT CHANGE UP (ref 7–23)
CALCIUM SERPL-MCNC: 8.8 MG/DL — SIGNIFICANT CHANGE UP (ref 8.4–10.5)
CHLORIDE SERPL-SCNC: 108 MMOL/L — SIGNIFICANT CHANGE UP (ref 96–108)
CO2 SERPL-SCNC: 24 MMOL/L — SIGNIFICANT CHANGE UP (ref 22–31)
COLOR SPEC: SIGNIFICANT CHANGE UP
CREAT SERPL-MCNC: 0.69 MG/DL — SIGNIFICANT CHANGE UP (ref 0.5–1.3)
DIFF PNL FLD: ABNORMAL
EGFR: 114 ML/MIN/1.73M2 — SIGNIFICANT CHANGE UP
EOSINOPHIL # BLD AUTO: 0.21 K/UL — SIGNIFICANT CHANGE UP (ref 0–0.5)
EOSINOPHIL NFR BLD AUTO: 2.4 % — SIGNIFICANT CHANGE UP (ref 0–6)
EPI CELLS # UR: 0 /HPF — SIGNIFICANT CHANGE UP
FLUAV AG NPH QL: SIGNIFICANT CHANGE UP
FLUBV AG NPH QL: SIGNIFICANT CHANGE UP
GLUCOSE SERPL-MCNC: 103 MG/DL — HIGH (ref 70–99)
GLUCOSE UR QL: NEGATIVE — SIGNIFICANT CHANGE UP
HCT VFR BLD CALC: 33.5 % — LOW (ref 34.5–45)
HGB BLD-MCNC: 10.6 G/DL — LOW (ref 11.5–15.5)
IMM GRANULOCYTES NFR BLD AUTO: 1.4 % — HIGH (ref 0–0.9)
KETONES UR-MCNC: NEGATIVE — SIGNIFICANT CHANGE UP
LDH SERPL L TO P-CCNC: 287 U/L — HIGH (ref 50–242)
LEUKOCYTE ESTERASE UR-ACNC: NEGATIVE — SIGNIFICANT CHANGE UP
LYMPHOCYTES # BLD AUTO: 1.88 K/UL — SIGNIFICANT CHANGE UP (ref 1–3.3)
LYMPHOCYTES # BLD AUTO: 21.6 % — SIGNIFICANT CHANGE UP (ref 13–44)
MAGNESIUM SERPL-MCNC: 1.9 MG/DL — SIGNIFICANT CHANGE UP (ref 1.6–2.6)
MCHC RBC-ENTMCNC: 27.3 PG — SIGNIFICANT CHANGE UP (ref 27–34)
MCHC RBC-ENTMCNC: 31.6 GM/DL — LOW (ref 32–36)
MCV RBC AUTO: 86.3 FL — SIGNIFICANT CHANGE UP (ref 80–100)
MONOCYTES # BLD AUTO: 0.46 K/UL — SIGNIFICANT CHANGE UP (ref 0–0.9)
MONOCYTES NFR BLD AUTO: 5.3 % — SIGNIFICANT CHANGE UP (ref 2–14)
NEUTROPHILS # BLD AUTO: 5.98 K/UL — SIGNIFICANT CHANGE UP (ref 1.8–7.4)
NEUTROPHILS NFR BLD AUTO: 68.8 % — SIGNIFICANT CHANGE UP (ref 43–77)
NITRITE UR-MCNC: NEGATIVE — SIGNIFICANT CHANGE UP
NRBC # BLD: 0 /100 WBCS — SIGNIFICANT CHANGE UP (ref 0–0)
PH UR: 7 — SIGNIFICANT CHANGE UP (ref 5–8)
PLATELET # BLD AUTO: 302 K/UL — SIGNIFICANT CHANGE UP (ref 150–400)
POTASSIUM SERPL-MCNC: 3.6 MMOL/L — SIGNIFICANT CHANGE UP (ref 3.5–5.3)
POTASSIUM SERPL-SCNC: 3.6 MMOL/L — SIGNIFICANT CHANGE UP (ref 3.5–5.3)
PROT SERPL-MCNC: 6.4 G/DL — SIGNIFICANT CHANGE UP (ref 6–8.3)
PROT UR-MCNC: NEGATIVE — SIGNIFICANT CHANGE UP
RBC # BLD: 3.88 M/UL — SIGNIFICANT CHANGE UP (ref 3.8–5.2)
RBC # FLD: 14 % — SIGNIFICANT CHANGE UP (ref 10.3–14.5)
RBC CASTS # UR COMP ASSIST: 3 /HPF — SIGNIFICANT CHANGE UP (ref 0–4)
RSV RNA NPH QL NAA+NON-PROBE: SIGNIFICANT CHANGE UP
SARS-COV-2 RNA SPEC QL NAA+PROBE: SIGNIFICANT CHANGE UP
SODIUM SERPL-SCNC: 143 MMOL/L — SIGNIFICANT CHANGE UP (ref 135–145)
SP GR SPEC: 1.02 — SIGNIFICANT CHANGE UP (ref 1.01–1.02)
UROBILINOGEN FLD QL: NEGATIVE — SIGNIFICANT CHANGE UP
WBC # BLD: 8.69 K/UL — SIGNIFICANT CHANGE UP (ref 3.8–10.5)
WBC # FLD AUTO: 8.69 K/UL — SIGNIFICANT CHANGE UP (ref 3.8–10.5)
WBC UR QL: 1 /HPF — SIGNIFICANT CHANGE UP (ref 0–5)

## 2023-02-14 PROCEDURE — 36415 COLL VENOUS BLD VENIPUNCTURE: CPT

## 2023-02-14 PROCEDURE — 81001 URINALYSIS AUTO W/SCOPE: CPT

## 2023-02-14 PROCEDURE — 85025 COMPLETE CBC W/AUTO DIFF WBC: CPT

## 2023-02-14 PROCEDURE — 93005 ELECTROCARDIOGRAM TRACING: CPT

## 2023-02-14 PROCEDURE — 83615 LACTATE (LD) (LDH) ENZYME: CPT

## 2023-02-14 PROCEDURE — 83735 ASSAY OF MAGNESIUM: CPT

## 2023-02-14 PROCEDURE — 87086 URINE CULTURE/COLONY COUNT: CPT

## 2023-02-14 PROCEDURE — 99285 EMERGENCY DEPT VISIT HI MDM: CPT

## 2023-02-14 PROCEDURE — 99284 EMERGENCY DEPT VISIT MOD MDM: CPT

## 2023-02-14 PROCEDURE — 80053 COMPREHEN METABOLIC PANEL: CPT

## 2023-02-14 PROCEDURE — 87637 SARSCOV2&INF A&B&RSV AMP PRB: CPT

## 2023-02-14 PROCEDURE — 87077 CULTURE AEROBIC IDENTIFY: CPT

## 2023-02-14 RX ORDER — NIFEDIPINE 30 MG
1 TABLET, EXTENDED RELEASE 24 HR ORAL
Qty: 30 | Refills: 0
Start: 2023-02-14 | End: 2023-03-15

## 2023-02-14 RX ORDER — NIFEDIPINE 30 MG
10 TABLET, EXTENDED RELEASE 24 HR ORAL ONCE
Refills: 0 | Status: COMPLETED | OUTPATIENT
Start: 2023-02-14 | End: 2023-02-14

## 2023-02-14 RX ORDER — ACETAMINOPHEN 500 MG
650 TABLET ORAL ONCE
Refills: 0 | Status: COMPLETED | OUTPATIENT
Start: 2023-02-14 | End: 2023-02-14

## 2023-02-14 RX ADMIN — Medication 650 MILLIGRAM(S): at 17:06

## 2023-02-14 RX ADMIN — Medication 10 MILLIGRAM(S): at 17:06

## 2023-02-14 NOTE — ED PROVIDER NOTE - PROGRESS NOTE DETAILS
Scott Schulte MD. ob gyn is bed side Scott Schulte MD. lab work non actionable. ob/gyn had recommended procardia 10 mg po now and reassess bp. pt's bp now 118/87. pt feeling well. ob/gyn also rec rx procardia 30 mg er qd with holding parameters of bp < 110/60 and hr > 110. pt cleared for dc. to f/u with her ob/gyn. ED evaluation and management discussed with the patient. Close PMD follow up encouraged.  Strict ED return instructions discussed in detail and patient given the opportunity to ask any questions about their discharge diagnosis and instructions. Patient verbalized understanding.

## 2023-02-14 NOTE — ED PROVIDER NOTE - OBJECTIVE STATEMENT
38 yo F  post partum day #5 (via vaginal delivery), hx postpartum preeclampsia, gestational DM, HTN, on ASA, presents to the ED for high blood pressures at home. Today, she was checking her BP and the highest it was 147/90s. She was told by her OB/GYN to come to the ED. Pt is only c/o neck pain x1 day. Denies HA, change in vision, abd pain, n/v/d, leg pain, leg swelling, cp, sob. Pt has been on labetalol and Procardia in the past.     OB/GYN Dr. Mariajose Parekh

## 2023-02-14 NOTE — ED PROVIDER NOTE - ATTENDING CONTRIBUTION TO CARE
I, Scott Schulte, performed a history and physical exam of the patient and discussed their management with the resident and/or advanced care provider. I reviewed the resident and/or advanced care provider's note and agree with the documented findings and plan of care except where noted. I was present and available for all procedures.     I wrote H&P and MDM see above

## 2023-02-14 NOTE — CONSULT NOTE ADULT - SUBJECTIVE AND OBJECTIVE BOX
SANDER UMANA  37y  Female [id number]    HPI:    asymptomatic  INCOMPLETE      Name of GYN Physician:     POB:      Pgyn: Denies fibroids, cysts, endometriosis, STI's, Abnormal pap smears     Home meds:     Hospital Meds:   MEDICATIONS  (STANDING):    MEDICATIONS  (PRN):      Allergies    No Known Allergies    Intolerances        PAST MEDICAL & SURGICAL HISTORY:  Preeclampsia      DM (diabetes mellitus), gestational  2015 preg      NVD (normal vaginal delivery)  , 2018: epidural      H/O dilation and curettage          FAMILY HISTORY:  FH: HTN (hypertension) (Father, Mother)    Family history of diabetes mellitus (DM) (Father, Mother)        Social History:  Denies smoking use, drug use, alcohol use.   +occasional social alcohol use    Vital Signs Last 24 Hrs  T(C): 36.7 (2023 17:05), Max: 36.9 (2023 16:04)  T(F): 98 (2023 17:05), Max: 98.5 (2023 16:04)  HR: 82 (2023 17:05) (82 - 99)  BP: 135/97 (2023 17:05) (135/97 - 157/108)  BP(mean): --  RR: 22 (2023 17:05) (16 - 22)  SpO2: 99% (2023 17:05) (97% - 99%)    Parameters below as of 2023 17:05  Patient On (Oxygen Delivery Method): room air        Physical Exam:   General: sitting comftorably in bed, NAD   HEENT: neck supple, full ROM, NEIL  CV: RR S1S2 no m/r/g  Lungs: CTA b/l, good air flow b/l   Back: No CVA tenderness  Abd: Soft, non-tender, non-distended.  NR NG  :  deferred  Ext: non-tender b/l, no edema     LABS:                              10.6   8.69  )-----------( 302      ( 2023 16:02 )             33.5     02-14    143  |  108  |  15  ----------------------------<  103<H>  3.6   |  24  |  0.69    Ca    8.8      2023 16:02  Mg     1.9         TPro  6.4  /  Alb  3.5  /  TBili  0.2  /  DBili  x   /  AST  31  /  ALT  34  /  AlkPhos  108      I&O's Detail      Urinalysis Basic - ( 2023 17:08 )    Color: Light Yellow / Appearance: Clear / S.020 / pH: x  Gluc: x / Ketone: Negative  / Bili: Negative / Urobili: Negative   Blood: x / Protein: Negative / Nitrite: Negative   Leuk Esterase: Negative / RBC: 3 /hpf / WBC 1 /HPF   Sq Epi: x / Non Sq Epi: 0 /hpf / Bacteria: Negative SANDER UMANA  37y  Female [id number]    HPI:    asymptomatic  INCOMPLETE  See assessment and plan for recs.      pmhx: Denies  ob: postpartum preeclampsia,  x2 (, 2018) with largest baby [id number], Miscarriage x 1  Recent pregnancy -  , c/b gestational DM and HTN  gyn: denies  surg: D+C, 2021, no complications   fhx: Mother and Father both with T2DM, HTN  soc: Denies cigarette smoking, drug use or alcohol use      Vital Signs Last 24 Hrs  T(C): 36.7 (2023 17:05), Max: 36.9 (2023 16:04)  T(F): 98 (2023 17:05), Max: 98.5 (2023 16:04)  HR: 82 (2023 17:05) (82 - 99)  BP: 135/97 (2023 17:05) (135/97 - 157/108)  BP(mean): --  RR: 22 (2023 17:05) (16 - 22)  SpO2: 99% (2023 17:05) (97% - 99%)    Parameters below as of 2023 17:05  Patient On (Oxygen Delivery Method): room air        Physical Exam:   General: sitting comftorably in bed, NAD   HEENT: neck supple, full ROM, NEIL  CV: RR S1S2 no m/r/g  Lungs: CTA b/l, good air flow b/l   Back: No CVA tenderness  Abd: Soft, non-tender, non-distended.  NR NG  :  deferred  Ext: non-tender b/l, no edema     LABS:                              10.6   8.69  )-----------( 302      ( 2023 16:02 )             33.5     02-14    143  |  108  |  15  ----------------------------<  103<H>  3.6   |  24  |  0.69    Ca    8.8      2023 16:02  Mg     1.9     02-14    TPro  6.4  /  Alb  3.5  /  TBili  0.2  /  DBili  x   /  AST  31  /  ALT  34  /  AlkPhos  108  02-14    I&O's Detail      Urinalysis Basic - ( 2023 17:08 )    Color: Light Yellow / Appearance: Clear / S.020 / pH: x  Gluc: x / Ketone: Negative  / Bili: Negative / Urobili: Negative   Blood: x / Protein: Negative / Nitrite: Negative   Leuk Esterase: Negative / RBC: 3 /hpf / WBC 1 /HPF   Sq Epi: x / Non Sq Epi: 0 /hpf / Bacteria: Negative

## 2023-02-14 NOTE — ED PROVIDER NOTE - PHYSICAL EXAMINATION
PHYSICAL EXAM:  GENERAL: non-toxic appearing; in no respiratory distress  HEAD Atraumatic, Normocephalic  NECK: No JVD; trachea midline  EYES: PERRL, EOMs intact b/l w/out deficits; normal conjunctiva  CHEST/LUNG: CTAB no wheezes/rhonchi/rales  HEART: RRR no murmur/gallops/rubs  ABDOMEN: soft, NT, ND; no ruq ttp  EXTREMITIES: No LE edema, +2 radial pulses b/l, +2 DP/PT pulses b/l  MUSCULOSKELETAL: FROM of all 4 extremities;   NERVOUS SYSTEM:  A&Ox3, No motor deficits or sensory deficits; CNII-XII intact; Speech is fluent and appropriate  SKIN:  Warm and dry as visualized

## 2023-02-14 NOTE — ED PROVIDER NOTE - CLINICAL SUMMARY MEDICAL DECISION MAKING FREE TEXT BOX
Scott Schulte MD. pt with pmhx preeclampsia, post partum day #5, presenting for high blood pressures at home. here in ED, 151/104. has neck pain but no other sx. pt neurologically intact here. otherwise VS. abd soft nt nd; no ruq ttp; no focal neurologic deficits. from of neck. no meningeal signs. concern for preeclampsia. will order cbc to eval for anemia, signs of hemolysis, cmp to eval lfts, baseline mag level. will consult ob/gyn for preeclampsia. will give mg or anti-hypertensives as needed. will reassess. dispo pending ob/gyn recs.

## 2023-02-14 NOTE — ED PROVIDER NOTE - NSFOLLOWUPINSTRUCTIONS_ED_ALL_ED_FT
You were evaluated in the emergency department for elevated blood pressures at home. Here in the emergency department, it was as high as 157/108. You were evaluated by the OB/GYN team here and they recommended that you start a medication called Procardia. You were given a dose here in the emergency department and your blood pressure lowered to 118/87.    A prescription for Procardia 30 milligrams was sent to your pharmacy. Please take 30 milligrams orally ONCE A DAY in the morning. Please check your blood pressure BEFORE you take it. If your blood pressure is lower than 110/60 or your heart rate is above 110, please do not take the Procardia. If your blood pressure is above 140/90, please call your OB/GYN immediately.    Please return to the emergency department for any worsening symptoms including but not limited to severe headache, blurred vision, numbness, tingling, abdominal pain (specifically in the right upper part), and/or shortness of breath.

## 2023-02-14 NOTE — ED PROVIDER NOTE - PATIENT PORTAL LINK FT
You can access the FollowMyHealth Patient Portal offered by [facility] by registering at the following website: [email or web address]. By joining [employer]’s FollowMyHealth portal, you will also be able to view your health information using other applications (apps) compatible with our system.

## 2023-02-14 NOTE — ED ADULT NURSE NOTE - OBJECTIVE STATEMENT
37y F A&ox4 c/o of htn s/p 5 days post-partum. Pt states that she was told if her Bp is over 140/90 to contact OB tea. OB instructed Pt to come to the Ed for further evaluation.  PT [id number]. Pt has had PPHTN in 2018 and treated with Mag, labetalol and procardia. Pt denies any HA/blurry vision/dizziness/N/V/D/vever/cough/Cp/SOB/gi/gu symptoms. PMH of preeclampsia . PSH of DNC.

## 2023-02-14 NOTE — ED PROVIDER NOTE - NS ED ROS FT
ROS:  Constitutional: no fevers; no chills  HEENT: no visual changes, no sore throat, no rhinorrhea  CV: no cp; no palpitations  Resp: no sob; no cough  GI: no abd pain, no nausea, no vomiting, no diarrhea, no constipation  : no dysuria, no hematuria  MSK: no myalgias; no arthralgias; neck pain  skin: no rashes  neuro: no HA, no numbness; no weakness, no tingling  endo: no polyuria, no polydipsia

## 2023-02-14 NOTE — CONSULT NOTE ADULT - ASSESSMENT
38yo  PPD#5 from , pregnancy c/b gHTN and A2, presents w/ BPs in 140s/90s at home. In ED, max /103, after which it downtrended to 140s/90s w/out increased in BP. HELLP wnl. Pt otherwise w/out complaints:    - Administer Procardia 10IR, start Procardia 30XL (hold precautions DBP <110 SBP <60 HR >120 or <50)  - Monitor for 2 hours after Proc 10IR, if BPs stay w/out 130s/80s, discharge w/ outpatient follow.    Amyeo Afroz Jereen, PGY-2  david Sanz

## 2023-02-16 LAB
CULTURE RESULTS: SIGNIFICANT CHANGE UP
SPECIMEN SOURCE: SIGNIFICANT CHANGE UP

## 2023-07-22 NOTE — PATIENT PROFILE OB - CAREGIVER RELATION TO PATIENT
Operative Note      Patient: Princess Tellez  YOB: 1977  MRN: [id number]    Date of Procedure: 2022    Pre-Op Diagnosis: CERVICAL RADICULOPATHY, DEGENERATIVE DISC DISEASE    Post-Op Diagnosis: Same       Procedure(s):  #2 CERVICAL EPIDURAL STEROID INJECTION UNDER FLUOROSCOPIC GUIDANCE AT C6-C7 LEFT PARAMEDIAN    Surgeon(s):  Augusto Isaac MD    Assistant:   * No surgical staff found *    Anesthesia: Local    Estimated Blood Loss (mL): Minimal    Complications: None    Specimens:   * No specimens in log *    Implants:  * No implants in log *      Drains: * No LDAs found *    Findings: good needle placement    Detailed Description of Procedure:   2022    Patient: Princess Tellez  :  1977  Age:  38 y.o. Sex:  female     PRE-PROCEDURE DIAGNOSIS: Cervical degenerative disc disease, cervical radicular pain. POST-PROCEDURE DIAGNOSIS: Same. PROCEDURE: Fluoroscopic guided cervical epidural steroid injection, #2 at C6-C7 level. SURGEON: Augusto Isaac MD    ANESTHESIA: local    ESTIMATED BLOOD LOSS: None.  ______________________________________________________________________  BRIEF HISTORY:  Princess Tellez comes in today for the second  therapeutic cervical epidural injection under fluoroscopic guidance. The potential complications of this procedure were discussed with the her again today. She has elected to undergo the aforementioned procedure. Jean complete History & Physical examination were reviewed in depth, a copy of which is in the chart. DESCRIPTION OF PROCEDURE:    After confirming written and informed consent, a time-out was performed and Jean name and date of birth, the procedure to be performed as well as the plan for the location of the needle insertion were confirmed. The patient was brought into the procedure room and placed in the prone position with the head flexed midline on the fluoroscopy table.  A pillow was placed under the patient's head to Physical Therapy    Visit Type: treatment  SUBJECTIVE  Patient agreed to participate in therapy this date.  Patient verbally agrees to allow the following to be present during session: caregiver, daughter and relative  Patient seen for PT treatment on 11T nursing unit. Collaboration with ANANT Jeff before session. Patient agrees for daughter and grandson to be present during session.   Patient / Family Goal: maximize function  Patient denies pain at rest and with activity.     OBJECTIVE     Cognitive Status   Orientation    - Oriented to: person, place, time and situation  Functional Communication   - Overall Status: within functional limits   - Forms of Communication: verbal    Patient Activity Tolerance: 1 to 1 activity to rest         Bed Mobility  - Supine to sit: supervision  Transfers  Assistive devices: gait belt, 2-wheeled walker, 1 person  - Sit to stand: contact guard/touching/steadying assist  - Stand to sit: contact guard/touching/steadying assist  - Stand pivot: contact guard/touching/steadying assist  - Toilet: contact guard/touching/steadying assist  Verbal cues for hand placement with transfers.       Ambulation / Gait  - Assistive device: gait belt, 1 person and hand hold  - Distance (feet unless otherwise indicated): 50  - Assist Level: minimal assist  - Surface: even and carpet  - Description: unsteady  Progression of ambulation with use of no AD, hand hold assist on right with need for min assist. Noted increased lateral sway with limb advancement and increased lack of coordination of steps.     Ambulation / Gait (additional trials)  - Assist Level: contact guard/touching/steadying assist  - Assistive device: gait belt, two-wheeled walker and 1 person  - Distance (feet unless otherwise indicated): 150; 75  - Surface: even and carpet  Noted improved stability with use of BUE on 2ww. Smoother gait pattern and less lateral sway. Patient stating she feels more comfortable and stable with 2ww vs hand  hold assist.     Interventions     Training provided: activity tolerance, balance retraining, bed mobility training, body mechanics, breathing/relaxation, gait training, safety training, use of assistive device, transfer training, energy conservation, functional ambulation and positioning    Skilled input: Verbal instruction/cues, tactile instruction/cues and posture correction  Verbal Consent: Writer verbally educated and received verbal consent for hand placement, positioning of patient, and techniques to be performed today from patient for clothing adjustments for techniques, hand placement and palpation for techniques and therapist position for techniques as described above and how they are pertinent to the patient's plan of care.         Education:   - Present and ready to learn: patient  Education provided during session:  - Results of above outlined education: Verbalizes understanding and Needs reinforcement    ASSESSMENT   Progress: progressing toward goals  Interferring components: medical status limitations, decreased insight into deficit and decreased activity tolerance    Summary of function and discharge needs based on today's assessment:   - Current level of function: slightly below baseline level of function   - Therapy needs at discharge: intensive daily therapy   - Activities of daily living (ADLs) requiring support at discharge: bed mobility, transfers, ambulation and stairs   - Instrumental activities of daily living (IADLs) requiring support at discharge: community mobility   - Impairments that require further therapy intervention: activity tolerance, balance, strength and safety awareness    AM-PAC  - Generalized Prior Level of Function: IND/MOD I ([facility] 22-24)       Key: MOD A=moderate assistance, IND/MOD I=independent/modified independent  - Generalized Current Level of Function     - Current Mobility Score: 18       AM-PAC Scoring Key= >21 Modified Independent; 20-21 Supervision; 18-19  increase cervical interlaminar space. Standard monitors were placed, and vital signs were observed throughout the procedure. The area was prepped with chloraprep and the C6-C7 interspace was marked under fluoroscopy. The skin and subcutaneous tissues at the above level were anesthestized with 0.5% lidocaine. An # 18 gauge 3-1/2 tuohy epidural needle was inserted and advanced toward the inferior lamina until bony contact was made. The needle was then advanced superiorly toward epidural space. From this point on loss of resistance technique with 5 cc glass syringe was used to confirm entrance of the needle into the epidural space under intermittent lateral fluoroscopy. Once in the epidural space, negative aspiration for blood and CSF was confirmed . Needle tip placement was confirmed by visualizing epidural spread of 1 ml of omnipaque 240 visualized in both AP and lateral live fluoroscopic views. Then after negative aspiration, a solution of 0.9 % Saline 3 ml and 60 mg DepoMedrol was easily injected. The needle was gently removed intact. The patient neck was cleaned and a Band-Aid was placed over the needle insertion point. Disposition the patient tolerated the procedure well and there were no complications . Vital signs remained stable throughout the procedure. The patient was escorted to the recovery area where they remained until discharge and written discharge instructions for the procedure were given. Plan: Philip Esquivel will return to our pain management center as scheduled.      Sanchez Deras MD Minimal assist; 13-18 Moderate assist; 9-12 Max assist; <9 Total assist    Jonelle Alonzo will benefit from continued skilled therapy in a post-acute rehab setting. A multi-disciplinary team approach is recommended for optimal improvement and education. Patient has potential to make excellent progress in therapy. Aggressive post-acute rehab is indicated to maximize function during acute phase of recovery. A team approach will be necessary to maximize function and safety to assist transition to the community. Patient demonstrates the capacity to tolerate 60-90 minutes of physical therapy.          PLAN   Suggestions for next session as indicated: Progress bed mobility, transfers, ambulation and stairs. Standing balance, coordination.     PT Frequency: 3-5 x per week      PT/OT Mobility Equipment for Discharge: Will continue to monitor, no anticipated needs  PT/OT ADL Equipment for Discharge: continue to assess      Agreement to plan and goals: patient agrees with goals and treatment plan and family/significant other/caregiver agrees        GOALS  Review Date: 7/27/2023  Long Term Goals: (to be met by time of discharge from hospital)  Sit to supine: Patient will complete sit to supine modified independent.  Supine to sit: Patient will complete supine to sit modified independent.  Sit to stand: Patient will complete sit to stand transfer with 2-wheeled walker, modified independent.   Stand to sit: Patient will complete stand to sit transfer with 2-wheeled walker, modified independent.   Ambulation (even): Patient will ambulate on even surface for 150 feet with 2-wheeled walker, modified independent.   Curb step: Patient will ambulate curb step with least restrictive device, modified independent.   Flight of stairs: Patient will ambulate a flight of stairs with least restrictive device using 1 rail, modified independent.     Documented in the chart in the following areas: Assessment/Plan.      Patient at End of  Session:   Location: in chair  Safety measures: call light within reach and lines intact  Handoff to: nurse and family/caregiver      Therapy procedure time and total treatment time can be found documented on the Time Entry flowsheet   Spouse

## 2024-02-19 NOTE — ED PROVIDER NOTE - DATE/TIME 1
No protocol for requested medication.    Medication: Zolpidem  Last office vi/sit date: 1/3/24  Pharmacy: [id number] -[place], [place] - [address]    Order pended, routed to clinician for review.      14-Feb-2023 16:00

## 2024-03-26 NOTE — PATIENT PROFILE OB - NS PRO PT RIGHT SUPPORT PERSON
2024     Юлия Joseph MD  [address]  [facility] [address]  [facility] [address]    Patient: Mamie Fisher   YOB: 1986   Date of Visit: 3/26/2024       Dear Юлия Joseph MD    Mamie Fisher was in my office today. Below is a copy of my note.    If you have questions, please do not hesitate to call me. I look forward to following Mamie along with you.         Sincerely,        Pina Lim MD      MATERNAL FETAL MEDICINE Consult Note    Dear Dr Юлия Joseph MD:    Thank you for your kind referral of Mamie Fisher.  As you know, she is a 37 y.o.   at 32  0/7 weeks gestation (Estimated Date of Delivery: 2024). This is a consult.     Her antepartum course is complicated by:  Type 2 Diabetes Mellitus  Advanced Maternal Age  H/O  for FTP    Aneuploidy Screening: LOW RISK    HPI: Today, she denies headache, blurry vision, RUQ pain. No vaginal bleeding, no contractions.     Review of History:  Past Medical History:   Diagnosis Date   • DM (diabetes mellitus)     type II DM   • GERD (gastroesophageal reflux disease)    • High cholesterol      Past Surgical History:   Procedure Laterality Date   •  SECTION     • ENDOSCOPY N/A 10/12/2022    Procedure: ESOPHAGOGASTRODUODENOSCOPY with biopsy;  Surgeon: Blanca Allison MD;  [facility];  Service: Gastroenterology;  Laterality: N/A;  GASTRITIS HIATAL HERNIA    • UPPER GASTROINTESTINAL ENDOSCOPY       Social History     Socioeconomic History   • Marital status:    Tobacco Use   • Smoking status: Never   • Smokeless tobacco: Never   Vaping Use   • Vaping status: Never Used   Substance and Sexual Activity   • Alcohol use: Not Currently     Comment: recreational   • Drug use: Never   • Sexual activity: Defer     Family History   Problem Relation Age of Onset   • Diabetes Mother    • Hypothyroidism Mother    • Diabetes Brother    • Alzheimer's disease Maternal Grandmother    • Stroke  Maternal Grandmother    • Diabetes Maternal Grandmother    • Diabetes Maternal Grandfather    • Malig Hyperthermia Neg Hx       No Known Allergies   Current Outpatient Medications on File Prior to Visit   Medication Sig Dispense Refill   • Blood Glucose Monitoring Suppl (OneTouch Verio) w/Device kit Use 1 each Daily. 1 kit 0   • Continuous Blood Gluc  (FreeStyle Wesley 14 Day [place]) device Use 1 each Every 14 (Fourteen) Days. 2 each 11   • Continuous Blood Gluc Sensor (FreeStyle Wesley 14 Day Sensor) misc Use 1 each Every 14 (Fourteen) Days. 2 each 11   • glucose blood test strip Use as instructed 300 each 3   • Insulin Pen Needle (Pen Needles) 31G X 5 MM misc Use 1 each 5 (Five) Times a Day. 200 each 4   • LOW-DOSE ASPIRIN PO Take  by mouth.     • OneTouch Delica Lancets 33G misc 1 each 2 (Two) Times a Day. 200 each 3   • prenatal vitamin (prenatal, CLASSIC, vitamin) tablet Take  by mouth Daily.     • HumuLIN N KwikPen 100 UNIT/ML injection Inject 18 Units under the skin into the appropriate area as directed Every Night. Variable dosing up to 100 units daily. Current dose: 24 units AM/30 units at bedtime. 30 mL 4   • Insulin Lispro, 1 Unit Dial, (HumaLOG KwikPen) 100 UNIT/ML solution pen-injector Variable Dose up to 100units. Starting dose will be 5 units with breakfast, lunch and dinner. 5 mL 5   • Nirmatrelvir & Ritonavir, 300mg/100mg, (PAXLOVID) Take 3 tablets by mouth 2 (Two) Times a Day. Start within 5 days of symptom onset 30 tablet 0     No current facility-administered medications on file prior to visit.      Past obstetric, gynecological, medical, surgical, family and social history reviewed.  Relevant lab work and imaging reviewed.    Review of systems  Constitutional:  denies fever, chills, malaise.   ENT/Mouth:  denies sore throat, tinnitus  Eyes: denies vision changes/pain  CV:  denies chest pain  Respiratory:  denies cough/SOB  GI:  denies N/V, diarrhea, abdominal pain.    :   denies  dysuria  Skin:  denies lesions or pruritus   Neuro:  denies weakness, focal neurologic symptoms    Vitals:    24 1449   BP: 128/78   BP Location: Right arm   Patient Position: Sitting   Pulse: 93   Temp: 98.7 °F (37.1 °C)   TempSrc: Temporal   Weight: 98.9 kg (218 lb)       PHYSICAL EXAM   GENERAL: Not in acute distress, AAOx3, pleasant  CARDIO: regular rate and rhythm  PULM: symmetric chest rise, speaking in complete sentences without difficulty  NEURO: awake, alert and oriented to person, place, and time  ABDOMINAL: No fundal tenderness, no rebound or guarding, gravid  EXTREMITIES: no bilateral lower extremity edema/tenderness  SKIN: Warm, well-perfused      ULTRASOUND   Please view full ultrasound note on Imaging tab in ViewPoint.  Cephalic presentation.  Anterior placenta.  NIA 18.5 cm, which is normal.   BPP 8/8.    ASSESSMENT/COUNSELIN y.o.   at 32  0/7 weeks gestation (Estimated Date of Delivery: 2024).     -Pregnancy  [ X ] stable  [   ] improving [  ] worsening    Diagnoses and all orders for this visit:    1. Type 2 diabetes mellitus without complication, with long-term current use of insulin (Primary)    2. Antepartum multigravida of advanced maternal age        TYPE 2 DIABETES MELLITUS  Previously counseled.    Blood sugar logs reviewed.  Mild elevations over last week but pt reports she had COVID and was not eating much/well.  Will follow and adjust next week as indicated now that she is feeling better.     New Regimen  NPH 35 units / 40 units  I:C ratio of 1:5    We discussed diet recommendations, and goal pre-prandial values below 90 and 1-hour post-prandial values below 120 to optimize her pregnancy outcome as many studies have demonstrated that this is the normal range for pregnant women without diabetes.We discussed meal timing and eating every 2-3 hours with 30g carbs at breakfast, 45 for lunch and dinner, and 10-15g snacks in between and at bedtime.  We also discussed  insulin teaching and hypoglycemia management and to call with BG <60 or >200.     FROM DR DUKES'S NOTE FOR FETAL ECHO  Impression: In summary, today's evaluation found the followin) Normal fetal echocardiogram-- no cardiac disease identified  2) Type 2 diabetes (on insulin)-- most recent A1c 6.2%  3) 24 weeks gestation     Ms Swartz's fetal echocardiogram today demonstrated normal segmental anatomy, chamber sizes, and ventricular wall thickness. No cardiac disease was identified. We discussed the limitations of fetal echo and the need for  echo only as needed for clinical concerns.     ADVANCED MATERNAL AGE  Previously Counseled: Recommend fetal growth surveillance. Start  fetal surveillance with weekly BPP at 32-36 weeks.  Recommend baby ASA, which she is taking.      Summary of Plan  -Continue baby aspirin  - Recommend emailing/fax/submitting her log weekly for review ON ; to bring log to all [facility] office visits.  - S/P Fetal Echo  -Serial growth ultrasounds every 4 weeks   -Starting at 32 weeks: twice weekly fetal  surveillance until delivery (Split between here and primary OB)  -Delivery 38-39 weeks unless indicated sooner.      Follow-up: weekly    Thank you for the consult and opportunity to care for this patient.  Please feel free to reach out with any questions or concerns.      I spent 14 minutes caring for this patient on this date of service. This time includes time spent by me in the following activities: preparing for the visit, reviewing tests, obtaining and/or reviewing a separately obtained history, performing a medically appropriate examination and/or evaluation, counseling and educating the patient/family/caregiver and independently interpreting results and communicating that information with the patient/family/caregiver with greater than 50% spent in counseling and coordination of care.     3 minutes reading US.      Pina Lim MD FACOG  Maternal  Fetal Medicine-[facility]  Office: [phone]  [email or web address]     same name as above

## 2024-04-17 NOTE — ASU PREOP CHECKLIST - ANTIBIOTIC
I was able to mail the patient his portion of the Eliquis application. Once I get this back I will be able to submit for review.        Latanya Ann [facility]  CC   Medication Assistance  Cindy [facility] [place] [facility]    P) [phone]  (F) [phone]    
n/a

## 2024-05-22 NOTE — OB RN DELIVERY SUMMARY - AMNIOTIC FLUID ODOR, LABOR
Medication: losartan passed protocol.   Last office visit date: 2/26/24  Next appointment scheduled?: Yes   Number of refills given: see script    normal

## 2024-07-19 NOTE — ED PROVIDER NOTE - NSCAREINITIATED _GEN_ER
Dangers of cigarette smoking were reviewed with patient in detail. Patient was Counseled for 3-10 minutes. Nicotine replacement options were discussed. Nicotine replacement was discussed- prescribed   Soniya Warren(Attending)

## 2024-10-12 NOTE — ED ADULT TRIAGE NOTE - AS O2 DELIVERY
room air
Assistance with ambulation/Communicate Risk of Fall with Harm to all staff/Reinforce activity limits and safety measures with patient and family/Tailored Fall Risk Interventions/Use of alarms - bed, chair and/or voice tab/Visual Cue: Yellow wristband and red socks/Bed in lowest position, wheels locked, appropriate side rails in place/Call bell, personal items and telephone in reach/Instruct patient to call for assistance before getting out of bed or chair/Non-slip footwear when patient is out of bed/[place] to call system/Physically safe environment - no spills, clutter or unnecessary equipment/Purposeful Proactive Rounding/Room/bathroom lighting operational, light cord in reach

## 2025-04-01 NOTE — OB PROVIDER H&P - NS_PROVCHECK_OBGYN_ALL_OB
SPOKE WITH PT, INFORMED OF PROVIDER RECOMMENDATION, PT IS AGREEABLE TO GO TO ER FOR EVALUATION.    Patient was informed of the reason for this intervention.

## (undated) DEVICE — SOL IRR POUR NS 0.9% 500ML

## (undated) DEVICE — VACUUM CURETTE BERKLEY OLYMPUS CURVED 16MM X 1/2"

## (undated) DEVICE — DRAPE LIGHT HANDLE COVER (GREEN)

## (undated) DEVICE — VACUUM CURETTE BERKLEY OLYMPUS CURVED 12MM

## (undated) DEVICE — TUBING UTERINE ASPIRATION 3/8" X 6FT W/O ADAPTER

## (undated) DEVICE — GLV 6.5 PROTEXIS (WHITE)

## (undated) DEVICE — WARMING BLANKET UPPER ADULT

## (undated) DEVICE — VACUUM CURETTE BUSSE HOSP CURVED 9MM

## (undated) DEVICE — VENODYNE/SCD SLEEVE CALF MEDIUM

## (undated) DEVICE — VACUUM CURETTE BERKLEY OLYMPUS CURVED 9MM

## (undated) DEVICE — POSITIONER FOAM EGG CRATE ULNAR 2PCS (PINK)

## (undated) DEVICE — VACUUM CURETTE BERKLEY OLYMPUS F TIP 6MM

## (undated) DEVICE — VACUUM CURETTE BUSSE HOSP CURVED 11MM

## (undated) DEVICE — VACUUM CURETTE BERKLEY OLYMPUS CURVED 8MM

## (undated) DEVICE — VACUUM CURETTE BERKLEY OLYMPUS CURVED 7MM

## (undated) DEVICE — VACUUM CURETTE MEDGYN CURVED 13MM

## (undated) DEVICE — PACK LITHOTOMY

## (undated) DEVICE — VACUUM CURETTE BUSSE HOSP CURVED 10MM

## (undated) DEVICE — GLV 7.5 PROTEXIS (WHITE)

## (undated) DEVICE — POSITIONER PATIENT SAFETY STRAP 3X60"

## (undated) DEVICE — SOCK SPECIMEN 3/8"-1/2" MALE PORT

## (undated) DEVICE — VACUUM CURETTE BUSSE HOSP CURVED 14MM

## (undated) DEVICE — VACUUM CURETTE BUSSE HOSP STRAIGHT 7MM

## (undated) DEVICE — VACUUM CURETTE BUSSE HOSP CURVED 12MM

## (undated) DEVICE — VACUUM CURETTE BERKLEY OLYMPUS CURVED 11MM